# Patient Record
Sex: FEMALE | NOT HISPANIC OR LATINO | Employment: UNEMPLOYED | URBAN - METROPOLITAN AREA
[De-identification: names, ages, dates, MRNs, and addresses within clinical notes are randomized per-mention and may not be internally consistent; named-entity substitution may affect disease eponyms.]

---

## 2021-06-04 ENCOUNTER — TELEPHONE (OUTPATIENT)
Dept: NEUROSURGERY | Facility: CLINIC | Age: 51
End: 2021-06-04

## 2021-06-04 NOTE — TELEPHONE ENCOUNTER
Caller: IRENE HERNANDEZ    Relationship to patient: SELF    Best call back number: [phone]    Chief complaint:     Type of visit: NEW PATIENT    Requested date: AS SOON AS POSSIBLE     If rescheduling, when is the original appointment:     Additional notes:  PT CALLED AND STATED SHE WOULD LIKE TO RESCHEDULE HER NEW PATIENT APPT.  PTS ORIGINAL APPT WAS SCHEDULE WITH [facility] FOR DR. FALCON.  DUE TO DR. FALCON BEING RETIRED PLEASE ADIVSE ON SCHEDULING.  PTS ORIGINAL APPT WAS ON 01/26/21 AND WAS CANCELLED DUE TO TRANSPORTATION ISSUES.    PLEASE CALL PT  THANK YOU

## 2021-06-28 ENCOUNTER — OFFICE VISIT (OUTPATIENT)
Dept: NEUROSURGERY | Facility: CLINIC | Age: 51
End: 2021-06-28

## 2021-06-28 VITALS
TEMPERATURE: 96.8 F | DIASTOLIC BLOOD PRESSURE: 100 MMHG | SYSTOLIC BLOOD PRESSURE: 140 MMHG | WEIGHT: 184.6 LBS | HEIGHT: 63 IN | BODY MASS INDEX: 32.71 KG/M2

## 2021-06-28 DIAGNOSIS — M54.42 CHRONIC BILATERAL LOW BACK PAIN WITH BILATERAL SCIATICA: ICD-10-CM

## 2021-06-28 DIAGNOSIS — G89.29 CHRONIC BILATERAL LOW BACK PAIN WITH BILATERAL SCIATICA: ICD-10-CM

## 2021-06-28 DIAGNOSIS — M51.36 DDD (DEGENERATIVE DISC DISEASE), LUMBAR: Primary | ICD-10-CM

## 2021-06-28 DIAGNOSIS — M19.90 ARTHRITIS: ICD-10-CM

## 2021-06-28 DIAGNOSIS — J45.909 ASTHMA, UNSPECIFIED ASTHMA SEVERITY, UNSPECIFIED WHETHER COMPLICATED, UNSPECIFIED WHETHER PERSISTENT: ICD-10-CM

## 2021-06-28 DIAGNOSIS — G47.30 SLEEP APNEA, UNSPECIFIED TYPE: ICD-10-CM

## 2021-06-28 DIAGNOSIS — M54.41 CHRONIC BILATERAL LOW BACK PAIN WITH BILATERAL SCIATICA: ICD-10-CM

## 2021-06-28 PROCEDURE — 99204 OFFICE O/P NEW MOD 45 MIN: CPT | Performed by: PHYSICIAN ASSISTANT

## 2021-06-28 RX ORDER — ALBUTEROL SULFATE 2.5 MG/3ML
SOLUTION RESPIRATORY (INHALATION)
COMMUNITY
Start: 2021-05-26

## 2021-06-28 RX ORDER — MELOXICAM 15 MG/1
TABLET ORAL
COMMUNITY
Start: 2021-06-22

## 2021-06-28 RX ORDER — LISINOPRIL 40 MG/1
40 TABLET ORAL DAILY
COMMUNITY
Start: 2021-06-07

## 2021-06-28 RX ORDER — ROSUVASTATIN CALCIUM 20 MG/1
20 TABLET, COATED ORAL DAILY
COMMUNITY

## 2021-06-28 NOTE — PROGRESS NOTES
Patient: Blanca Sandoval  : 1970  Chart #: [id number]    Date of Service: 2021    Chief Complaint   Patient presents with   • Neck Pain     new pt   • Back Pain   • Leg Pain     HPI  This is a 51-year-old female with a long history of low back pain who presents today for reevaluation by neurosurgery.  The patient reports that she was evaluated by neurosurgery in [place] in 2017 or 2018 and they told her she had degenerative disc disease and that eventually she would need to have surgery.  She did go to physical therapy for about 3 visit but that made her pain worse, she requested to be referred to pain management but that never got set up.  Her last MRI scan was in [place] but she does not have copies.  Today she states that she has pain all across the lower back and into the thoracic area and across to her anterior ribs, her pain is worse with twisting.  Her worst pain is across her lower back radiating into the hips and pain that radiates into the left anterior thigh she describes as aching throbbing stabbing and sharp.  Sitting does not really help, she gets pain all the way down to the top of both feet with extended walking.  No complaints of bowel or bladder dysfunction.    Chronic illnesses:  Tobacco abuse  Chronic low back pain and neck pain  Degenerative disc disease  Past Medical History:   Diagnosis Date   • Anxiety and depression    • Arthritis    • Asthma    • Back pain    • GERD (gastroesophageal reflux disease)    • Hyperlipidemia    • Hypertension    • Low back pain    • Neck pain    • Sleep apnea        No Known Allergies      Current Outpatient Medications:   •  rosuvastatin (CRESTOR) 20 MG tablet, Take 20 mg by mouth Daily., Disp: , Rfl:   •  albuterol (PROVENTIL) (2.5 MG/3ML) 0.083% nebulizer solution, INHALE 1 VAIL INTO THE LUNGS EVERY 4 TO 6 HOURS AS NEEDED, Disp: , Rfl:   •  lisinopril (PRINIVIL,ZESTRIL) 40 MG tablet, Take 40 mg by mouth Daily., Disp: , Rfl:   •  meloxicam  (MOBIC) 15 MG tablet, , Disp: , Rfl:     Social History     Socioeconomic History   • Marital status:      Spouse name: Not on file   • Number of children: Not on file   • Years of education: Not on file   • Highest education level: Not on file   Tobacco Use   • Smoking status: Current Some Day Smoker     Packs/day: 1.50   Vaping Use   • Vaping Use: Former   Substance and Sexual Activity   • Alcohol use: Not Currently   • Drug use: Yes     Types: Marijuana     Comment: occ   • Sexual activity: Defer       History reviewed. No pertinent family history.    Review of Systems   Constitutional: Positive for appetite change and fatigue. Negative for activity change, chills, diaphoresis, fever and unexpected weight change.   HENT: Positive for congestion, ear discharge, ear pain and sinus pressure. Negative for dental problem, drooling, facial swelling, hearing loss, mouth sores, nosebleeds, postnasal drip, rhinorrhea, sinus pain, sneezing, sore throat, tinnitus, trouble swallowing and voice change.    Eyes: Positive for itching. Negative for photophobia, pain, discharge, redness and visual disturbance.   Respiratory: Positive for cough, shortness of breath and wheezing. Negative for apnea, choking, chest tightness and stridor.    Cardiovascular: Positive for leg swelling. Negative for chest pain and palpitations.   Gastrointestinal: Positive for abdominal distention and constipation. Negative for abdominal pain, anal bleeding, blood in stool, diarrhea, nausea, rectal pain and vomiting.   Endocrine: Positive for cold intolerance and heat intolerance. Negative for polydipsia, polyphagia and polyuria.   Genitourinary: Positive for difficulty urinating and frequency. Negative for decreased urine volume, dyspareunia, dysuria, enuresis, flank pain, genital sores, hematuria, menstrual problem, pelvic pain, urgency, vaginal bleeding and vaginal discharge.   Musculoskeletal: Positive for back pain, neck pain and neck  "stiffness. Negative for arthralgias, gait problem, joint swelling and myalgias.   Skin: Negative for color change, pallor, rash and wound.   Allergic/Immunologic: Positive for environmental allergies. Negative for food allergies and immunocompromised state.   Neurological: Positive for tremors (back and hands). Negative for dizziness, seizures, syncope, facial asymmetry, speech difficulty, weakness, light-headedness, numbness and headaches.   Hematological: Negative for adenopathy. Does not bruise/bleed easily.   Psychiatric/Behavioral: Positive for sleep disturbance. Negative for agitation, behavioral problems, confusion, decreased concentration, dysphoric mood, hallucinations, self-injury and suicidal ideas. The patient is nervous/anxious. The patient is not hyperactive.        Patient's Body mass index is 32.7 kg/m². indicating that she is obese (BMI >30). Obesity-related health conditions include the following: obstructive sleep apnea, hypertension and osteoarthritis. Obesity is unchanged. BMI is is above average; BMI management plan is completed.      Social History    Tobacco Use      Smoking status: Current Some Day Smoker        Packs/day: 1.50  Blanca Sandoval  reports that she has been smoking. She has been smoking about 1.50 packs per day. She does not have any smokeless tobacco history on file.. I have educated her on the risk of diseases from using tobacco products such as cancer, COPD, heart disease and Degenerative disc disease.     I advised her to quit and she is not willing to quit, however she states that she did try the Chantix once before and I have recommended that she try it again, she is to discuss this with her PCP.    I spent 5 minutes counseling the patient.    Physical examination:  Blood pressure 140/100, temperature 96.8 °F (36 °C), height 160 cm (63\"), weight 83.7 kg (184 lb 9.6 oz).  HEENT- normocephalic, atraumatic, sclera clear  Lungs-normal expansion, no wheezing  Heart-regular rate " and rhythm  Extremities-positive pulses, no edema    Neurologic Exam    WDWN WF  A/A/C, speech clear, attention normal, conversant, answers questions appropriately, good historian.  Cranial nerves II through XII are intact  Motor examination does not reveal weakness in the , upper or lower extremities.   Sensation is intact.  Gait is normal, balance is normal.   No tremors are noted.  Reflexes are intact.     Palpation of the paraspinal muscles of the thoracic and lumbar spine and palpation into the SI joints reveals tenderness, worse in the left SI joint, no significant buttocks pain, mild pain in the left hip bursa.  Straight leg raising causes low back pain.    Radiographic Imaging:  No new studies for review    Medical Decision Making  Assessment and Plan:  1.  Chronic low back pain  2.  Degenerative disc disease lumbar spine per the patient's report  3.  Tobacco abuse-we have discussed smoking cessation which is really a must for her given her COPD and degenerative disc disease.  4.  Sleep apnea-the patient has not been evaluated but she thinks she needs a CPAP, she does not sleep well, she is tired in the morning when she wakes up, she is tired in the evenings and has difficulties with sleep. 5.  Obesity-BMI 32.7  6.  Physical deconditioning    I have ordered MRI of the lumbar spine.  Have also ordered x-rays of the thoracic and lumbar spine with flexion-extension views on lumbar.  However see her back in the office after her diagnostic studies are completed.  I have also made a referral to sleep medicine for evaluation.    Patient is in agreement with this plan.    Fawn Cardenas PA-C       Patient Care Team:  Rose Blair PA as PCP - General (Physician Assistant)  Rose Blair PA as Referring Physician (Physician Assistant)

## 2021-07-23 ENCOUNTER — TELEPHONE (OUTPATIENT)
Dept: NEUROSURGERY | Facility: CLINIC | Age: 51
End: 2021-07-23

## 2021-07-23 NOTE — TELEPHONE ENCOUNTER
Caller: MICHAEL    Relationship: [facility]    Best call back number: [phone]    What is the best time to reach you:ANYTIME    Who are you requesting to speak with (clinical staff, provider,  specific staff member):CLINICAL    Do you know the name of the person who called: NA    What was the call regarding:MICHAEL FROM [facility] CALLED AND WANTED TO MAKE OFFICE AWARE THAT INSURANCE IS DENYING THE PTS MRI BECAUSE THEY NEEDED CT OR XRAY IMAGING BEFORE AUTH. CAN BE GIVEN FOR THE MRI. MICHAEL STATED THE PT ONLY HAD A OFFICE NOTE. MICHAEL ALSO STATED THAT THE REFERRAL HAD BEEN ROUTED BACK TO OUR PRACTICE. PLEASE ADVISE THANK YOU!    Do you require a callback:NO

## 2021-07-27 ENCOUNTER — APPOINTMENT (OUTPATIENT)
Dept: MRI IMAGING | Facility: HOSPITAL | Age: 51
End: 2021-07-27

## 2021-08-16 ENCOUNTER — TELEPHONE (OUTPATIENT)
Dept: NEUROSURGERY | Facility: CLINIC | Age: 51
End: 2021-08-16

## 2021-08-16 NOTE — TELEPHONE ENCOUNTER
I AM RESCHEDULING THIS PATIENT'S MRI LUMBAR SPINE. SPOKE WITH PT AND SHE WOULD LIKE TO HAVE CERVICAL MRI AS WELL SINCE SHE IS HAVING HEADACHES AND NECK PAIN. PLEASE ADVISE.

## 2021-08-16 NOTE — TELEPHONE ENCOUNTER
At patient's last visit, they were only able to appropriately work-up the patient's low back and leg pain.  If possible, please have her keep her lumbar of the MRI and have her follow-up with [facility] for work-up of that scan.  If patient is continuing to have neck pain and headaches, [facility] will discuss that at that time.

## 2021-08-31 ENCOUNTER — OFFICE VISIT (OUTPATIENT)
Dept: NEUROSURGERY | Facility: CLINIC | Age: 51
End: 2021-08-31

## 2021-08-31 ENCOUNTER — HOSPITAL ENCOUNTER (OUTPATIENT)
Dept: MRI IMAGING | Facility: HOSPITAL | Age: 51
Discharge: HOME | End: 2021-08-31
Admitting: PHYSICIAN ASSISTANT

## 2021-08-31 VITALS
HEIGHT: 63 IN | DIASTOLIC BLOOD PRESSURE: 90 MMHG | SYSTOLIC BLOOD PRESSURE: 140 MMHG | WEIGHT: 186.4 LBS | BODY MASS INDEX: 33.03 KG/M2 | TEMPERATURE: 96.4 F

## 2021-08-31 DIAGNOSIS — G89.29 CHRONIC BILATERAL LOW BACK PAIN WITH BILATERAL SCIATICA: ICD-10-CM

## 2021-08-31 DIAGNOSIS — G47.30 SLEEP APNEA, UNSPECIFIED TYPE: ICD-10-CM

## 2021-08-31 DIAGNOSIS — M54.42 CHRONIC BILATERAL LOW BACK PAIN WITH BILATERAL SCIATICA: ICD-10-CM

## 2021-08-31 DIAGNOSIS — M54.41 CHRONIC BILATERAL LOW BACK PAIN WITH BILATERAL SCIATICA: ICD-10-CM

## 2021-08-31 DIAGNOSIS — J45.909 ASTHMA, UNSPECIFIED ASTHMA SEVERITY, UNSPECIFIED WHETHER COMPLICATED, UNSPECIFIED WHETHER PERSISTENT: ICD-10-CM

## 2021-08-31 DIAGNOSIS — M79.604 BILATERAL LEG PAIN: ICD-10-CM

## 2021-08-31 DIAGNOSIS — M19.90 ARTHRITIS: ICD-10-CM

## 2021-08-31 DIAGNOSIS — M19.90 ARTHRITIS: Primary | ICD-10-CM

## 2021-08-31 DIAGNOSIS — M79.605 BILATERAL LEG PAIN: ICD-10-CM

## 2021-08-31 DIAGNOSIS — R53.81 PHYSICAL DECONDITIONING: ICD-10-CM

## 2021-08-31 DIAGNOSIS — M51.36 DDD (DEGENERATIVE DISC DISEASE), LUMBAR: ICD-10-CM

## 2021-08-31 PROCEDURE — 72148 MRI LUMBAR SPINE W/O DYE: CPT

## 2021-08-31 PROCEDURE — 99214 OFFICE O/P EST MOD 30 MIN: CPT | Performed by: PHYSICIAN ASSISTANT

## 2021-09-02 PROBLEM — M79.604 BILATERAL LEG PAIN: Status: ACTIVE | Noted: 2021-09-02

## 2021-09-02 PROBLEM — M79.605 BILATERAL LEG PAIN: Status: ACTIVE | Noted: 2021-09-02

## 2021-09-02 PROBLEM — R53.81 PHYSICAL DECONDITIONING: Status: ACTIVE | Noted: 2021-09-02

## 2021-09-02 NOTE — PROGRESS NOTES
Patient: Blanca Sandoval  : 1970  Chart #: [id number]    Date of service: 2021      Chief Complaint   Patient presents with   • Follow-up     MRI   • Back Pain     Back Pain  Pertinent negatives include no abdominal pain, chest pain, dysuria, fever, headaches, numbness, pelvic pain or weakness.     This is a 51-year-old female with a long history of low back pain who presents today for reevaluation by neurosurgery.  The patient reports that she was evaluated by neurosurgery in [place] in 2017 or 2018 and they told her she had degenerative disc disease and that eventually she would need to have surgery.  She did go to physical therapy recently for about 3 visit but that made her pain worse, she requested to be referred to pain management but that never got set up.  Her last MRI scan was in [place] but she does not have copies.  Today she states that she has pain all across the lower back and into the thoracic area and across to her anterior ribs, her pain is worse with twisting.  Her worst pain is across her lower back radiating into the hips and pain that radiates into the left anterior thigh she describes as aching throbbing stabbing and sharp.  Sitting does not really help, she gets pain all the way down to the top of both feet with extended walking.  No complaints of bowel or bladder dysfunction.    She presents today with new diagnostic studies to review.     Chronic illnesses:  Tobacco abuse  Chronic low back pain and neck pain  Degenerative disc disease  Past Medical History:   Diagnosis Date   • Anxiety and depression    • Arthritis    • Asthma    • Back pain    • GERD (gastroesophageal reflux disease)    • Hyperlipidemia    • Hypertension    • Low back pain    • Neck pain    • Sleep apnea        No Known Allergies      Current Outpatient Medications:   •  albuterol (PROVENTIL) (2.5 MG/3ML) 0.083% nebulizer solution, INHALE 1 VAIL INTO THE LUNGS EVERY 4 TO 6 HOURS AS NEEDED, Disp: , Rfl:   •   lisinopril (PRINIVIL,ZESTRIL) 40 MG tablet, Take 40 mg by mouth Daily., Disp: , Rfl:   •  rosuvastatin (CRESTOR) 20 MG tablet, Take 20 mg by mouth Daily., Disp: , Rfl:   •  meloxicam (MOBIC) 15 MG tablet, , Disp: , Rfl:     Social History     Socioeconomic History   • Marital status:      Spouse name: Not on file   • Number of children: Not on file   • Years of education: Not on file   • Highest education level: Not on file   Tobacco Use   • Smoking status: Current Some Day Smoker     Packs/day: 1.50   Vaping Use   • Vaping Use: Former   Substance and Sexual Activity   • Alcohol use: Not Currently   • Drug use: Yes     Types: Marijuana     Comment: occ   • Sexual activity: Defer       History reviewed. No pertinent family history.    Review of Systems   Constitutional: Positive for appetite change and fatigue. Negative for activity change, chills, diaphoresis, fever and unexpected weight change.   HENT: Positive for congestion, ear discharge, ear pain and sinus pressure. Negative for dental problem, drooling, facial swelling, hearing loss, mouth sores, nosebleeds, postnasal drip, rhinorrhea, sinus pain, sneezing, sore throat, tinnitus, trouble swallowing and voice change.    Eyes: Positive for itching. Negative for photophobia, pain, discharge, redness and visual disturbance.   Respiratory: Positive for cough, shortness of breath and wheezing. Negative for apnea, choking, chest tightness and stridor.    Cardiovascular: Positive for leg swelling. Negative for chest pain and palpitations.   Gastrointestinal: Positive for abdominal distention and constipation. Negative for abdominal pain, anal bleeding, blood in stool, diarrhea, nausea, rectal pain and vomiting.   Endocrine: Positive for cold intolerance and heat intolerance. Negative for polydipsia, polyphagia and polyuria.   Genitourinary: Positive for difficulty urinating and frequency. Negative for decreased urine volume, dyspareunia, dysuria, enuresis,  flank pain, genital sores, hematuria, menstrual problem, pelvic pain, urgency, vaginal bleeding and vaginal discharge.   Musculoskeletal: Positive for back pain, neck pain and neck stiffness. Negative for arthralgias, gait problem, joint swelling and myalgias.   Skin: Negative for color change, pallor, rash and wound.   Allergic/Immunologic: Positive for environmental allergies. Negative for food allergies and immunocompromised state.   Neurological: Positive for tremors (back and hands). Negative for dizziness, seizures, syncope, facial asymmetry, speech difficulty, weakness, light-headedness, numbness and headaches.   Hematological: Negative for adenopathy. Does not bruise/bleed easily.   Psychiatric/Behavioral: Positive for sleep disturbance. Negative for agitation, behavioral problems, confusion, decreased concentration, dysphoric mood, hallucinations, self-injury and suicidal ideas. The patient is nervous/anxious. The patient is not hyperactive.        Patient's Body mass index is 33.02 kg/m². indicating that she is obese (BMI >30). Obesity-related health conditions include the following: obstructive sleep apnea, hypertension and osteoarthritis. Obesity is unchanged. BMI is is above average; BMI management plan is completed.      Social History    Tobacco Use      Smoking status: Current Some Day Smoker        Packs/day: 1.50  Blanca Sandoval  reports that she has been smoking. She has been smoking about 1.50 packs per day. She does not have any smokeless tobacco history on file.. I have educated her on the risk of diseases from using tobacco products such as cancer, COPD, heart disease and Degenerative disc disease.     I advised her to quit and she is not willing to quit, however she states that she did try the Chantix once before and I have recommended that she try it again, she is to discuss this with her PCP.    I spent 5 minutes counseling the patient.    Physical examination:  Blood pressure 140/90,  "temperature 96.4 °F (35.8 °C), height 160 cm (63\"), weight 84.6 kg (186 lb 6.4 oz).  HEENT- normocephalic, atraumatic, sclera clear  Lungs-normal expansion, no wheezing  Heart-regular rate and rhythm  Extremities-positive pulses, no edema    Neurologic Exam    WDWN WF  A/A/C, speech clear, attention normal, conversant, answers questions appropriately, good historian.  Cranial nerves II through XII are intact  Motor examination does not reveal weakness in the , upper or lower extremities.   Sensation is intact.  Gait is normal, balance is normal.   No tremors are noted.  Reflexes are intact.     Palpation of the paraspinal muscles of the thoracic and lumbar spine and palpation into the SI joints reveals tenderness, worse in the left SI joint, no significant buttocks pain, mild pain in the left hip bursa.  Straight leg raising causes low back pain.    Radiographic Imaging:  MRI scan of the lumbar spine shows degenerative disc disease at L4-5 and L5-S1.  There is mild Modic changes of the endplates of L4 and L5.  There is a broad-based disc bulging at L4-5 with bilateral foraminal narrowing mild facet hypertrophy worse on the left than the right and fluid noted within the facet joints at L4-5.  At L5-S1 there is a mild broad-based disc bulging without nerve root compression there is again mild fluid noted within the facet joints.    Medical Decision Making  Assessment and Plan:  1.  Chronic low back pain-consistent with degenerative disc disease at L4-5 and L5-S1.  Unfortunately the patient was unable to get flexion-extension x-rays of the lumbar spine.  We discussed today that this is of significant importance to make determination of instability with facet joints.  Based upon the findings on the diagnostic studies gives guidance to whether surgical intervention is required.  2.  Degenerative disc disease lumbar spine, L4-5 and L5-S1.  3.  Tobacco abuse  4.  Sleep apnea-the patient has been referred to sleep " medicine for evaluation of sleep apnea.  5.  Obesity-BMI 33.02, weight 186  6.  Physical deconditioning    The patient will follow up with flexion-extension x-rays of the lumbar spine and thoracic spine.    QASIM PeoplesC       Patient Care Team:  Rose Blair PA as PCP - General (Physician Assistant)  Rose Blair PA as Referring Physician (Physician Assistant)

## 2021-09-09 ENCOUNTER — OFFICE VISIT (OUTPATIENT)
Dept: NEUROSURGERY | Facility: CLINIC | Age: 51
End: 2021-09-09

## 2021-09-09 ENCOUNTER — HOSPITAL ENCOUNTER (OUTPATIENT)
Dept: GENERAL RADIOLOGY | Facility: HOSPITAL | Age: 51
Discharge: HOME | End: 2021-09-09

## 2021-09-09 VITALS
TEMPERATURE: 96.8 F | DIASTOLIC BLOOD PRESSURE: 74 MMHG | WEIGHT: 179.4 LBS | BODY MASS INDEX: 31.79 KG/M2 | HEIGHT: 63 IN | SYSTOLIC BLOOD PRESSURE: 140 MMHG

## 2021-09-09 DIAGNOSIS — M54.42 CHRONIC BILATERAL LOW BACK PAIN WITH BILATERAL SCIATICA: ICD-10-CM

## 2021-09-09 DIAGNOSIS — R53.81 PHYSICAL DECONDITIONING: ICD-10-CM

## 2021-09-09 DIAGNOSIS — G89.29 CHRONIC BILATERAL LOW BACK PAIN WITH BILATERAL SCIATICA: ICD-10-CM

## 2021-09-09 DIAGNOSIS — M19.90 ARTHRITIS: Primary | ICD-10-CM

## 2021-09-09 DIAGNOSIS — M54.41 CHRONIC BILATERAL LOW BACK PAIN WITH BILATERAL SCIATICA: ICD-10-CM

## 2021-09-09 DIAGNOSIS — M51.36 DDD (DEGENERATIVE DISC DISEASE), LUMBAR: ICD-10-CM

## 2021-09-09 DIAGNOSIS — M19.90 ARTHRITIS: ICD-10-CM

## 2021-09-09 PROCEDURE — 72070 X-RAY EXAM THORAC SPINE 2VWS: CPT

## 2021-09-09 PROCEDURE — 99214 OFFICE O/P EST MOD 30 MIN: CPT | Performed by: PHYSICIAN ASSISTANT

## 2021-09-09 PROCEDURE — 72110 X-RAY EXAM L-2 SPINE 4/>VWS: CPT

## 2021-09-09 NOTE — PROGRESS NOTES
Blanca Sandoval   1970   [id number]       2021     Chief Complaint   Patient presents with   • Follow-up     xray        HPI   ***    Description of pain-***  Timing of pain-***  Location of pain-***,   radiation of pain-***  Duration of Pain-***  Treatment-***    Chronic Illnesses:  ***  Past Medical History:  No date: Anxiety and depression  No date: Arthritis  No date: Asthma  No date: Back pain  No date: GERD (gastroesophageal reflux disease)  No date: Hyperlipidemia  No date: Hypertension  No date: Low back pain  No date: Neck pain  No date: Sleep apnea     Past Surgical History:   Procedure Laterality Date   •  SECTION     • KNEE SURGERY          No Known Allergies       Current Outpatient Medications:   •  albuterol (PROVENTIL) (2.5 MG/3ML) 0.083% nebulizer solution, INHALE 1 VAIL INTO THE LUNGS EVERY 4 TO 6 HOURS AS NEEDED, Disp: , Rfl:   •  lisinopril (PRINIVIL,ZESTRIL) 40 MG tablet, Take 40 mg by mouth Daily., Disp: , Rfl:   •  meloxicam (MOBIC) 15 MG tablet, , Disp: , Rfl:   •  rosuvastatin (CRESTOR) 20 MG tablet, Take 20 mg by mouth Daily., Disp: , Rfl:      Social History     Socioeconomic History   • Marital status:      Spouse name: Not on file   • Number of children: Not on file   • Years of education: Not on file   • Highest education level: Not on file   Tobacco Use   • Smoking status: Current Some Day Smoker     Packs/day: 1.00   Vaping Use   • Vaping Use: Former   Substance and Sexual Activity   • Alcohol use: Not Currently   • Drug use: Yes     Types: Marijuana     Comment: occ   • Sexual activity: Defer        family history is not on file.     Social History    Tobacco Use      Smoking status: Current Some Day Smoker        Packs/day: 1.00       Body mass index is 31.78 kg/m².   Patient's Body mass index is 31.78 kg/m². indicating that she is {weight categories:02730}.       /74 (BP Location: Right arm, Patient Position: Sitting, Cuff Size: Adult)   Temp 96.8  "°F (36 °C)   Ht 160 cm (63\")   Wt 81.4 kg (179 lb 6.4 oz)   BMI 31.78 kg/m²    Physical Examination:  HEENT-wnl  Lungs-No wheezing or SOB      Neurologic Exam   ***    Radiological Data Review:  I have independently interpreted the imaging and discussed the findings with patient and discussed appropriate management.  ***      Other diagnostic test review:    Assessment and Plan:        Fawn Cardenas, MICHAEL      PCP:  Rose Blair PA     "

## 2021-09-09 NOTE — PROGRESS NOTES
Patient: Blanca Sandoval  : 1970  Chart #: [id number]    Date of service: 2021      Chief Complaint   Patient presents with   • Follow-up     xray     Back Pain  Pertinent negatives include no abdominal pain, chest pain, dysuria, fever, headaches, numbness, pelvic pain or weakness.     This is a 51-year-old female with a long history of low back pain who presents today for reevaluation by neurosurgery.  The patient reports that she was evaluated by neurosurgery in [place] in 2017 or 2018 and they told her she had degenerative disc disease and that eventually she would need to have surgery.  She did go to physical therapy recently for about 3 visit but that made her pain worse, she requested to be referred to pain management but that never got set up.  Her last MRI scan was in [place] but she does not have copies.  Today she states that she has pain all across the lower back and into the thoracic area and across to her anterior ribs, her pain is worse with twisting.  Her worst pain is across her lower back radiating into the hips and pain that radiates into the left anterior thigh she describes as aching throbbing stabbing and sharp.  Sitting does not really help, she gets pain all the way down to the top of both feet with extended walking.  No complaints of bowel or bladder dysfunction.    She presents today with new diagnostic studies to review.     Chronic illnesses:  Tobacco abuse  Chronic low back pain and neck pain  Degenerative disc disease  Past Medical History:   Diagnosis Date   • Anxiety and depression    • Arthritis    • Asthma    • Back pain    • GERD (gastroesophageal reflux disease)    • Hyperlipidemia    • Hypertension    • Low back pain    • Neck pain    • Sleep apnea        No Known Allergies      Current Outpatient Medications:   •  albuterol (PROVENTIL) (2.5 MG/3ML) 0.083% nebulizer solution, INHALE 1 VAIL INTO THE LUNGS EVERY 4 TO 6 HOURS AS NEEDED, Disp: , Rfl:   •  lisinopril  (PRINIVIL,ZESTRIL) 40 MG tablet, Take 40 mg by mouth Daily., Disp: , Rfl:   •  meloxicam (MOBIC) 15 MG tablet, , Disp: , Rfl:   •  rosuvastatin (CRESTOR) 20 MG tablet, Take 20 mg by mouth Daily., Disp: , Rfl:     Social History     Socioeconomic History   • Marital status:      Spouse name: Not on file   • Number of children: Not on file   • Years of education: Not on file   • Highest education level: Not on file   Tobacco Use   • Smoking status: Current Some Day Smoker     Packs/day: 1.00   Vaping Use   • Vaping Use: Former   Substance and Sexual Activity   • Alcohol use: Not Currently   • Drug use: Yes     Types: Marijuana     Comment: occ   • Sexual activity: Defer       History reviewed. No pertinent family history.    Review of Systems   Constitutional: Positive for appetite change and fatigue. Negative for activity change, chills, diaphoresis, fever and unexpected weight change.   HENT: Positive for congestion, ear discharge, ear pain and sinus pressure. Negative for dental problem, drooling, facial swelling, hearing loss, mouth sores, nosebleeds, postnasal drip, rhinorrhea, sinus pain, sneezing, sore throat, tinnitus, trouble swallowing and voice change.    Eyes: Positive for itching. Negative for photophobia, pain, discharge, redness and visual disturbance.   Respiratory: Positive for cough, shortness of breath and wheezing. Negative for apnea, choking, chest tightness and stridor.    Cardiovascular: Positive for leg swelling. Negative for chest pain and palpitations.   Gastrointestinal: Positive for abdominal distention and constipation. Negative for abdominal pain, anal bleeding, blood in stool, diarrhea, nausea, rectal pain and vomiting.   Endocrine: Positive for cold intolerance and heat intolerance. Negative for polydipsia, polyphagia and polyuria.   Genitourinary: Positive for difficulty urinating and frequency. Negative for decreased urine volume, dyspareunia, dysuria, enuresis, flank pain,  genital sores, hematuria, menstrual problem, pelvic pain, urgency, vaginal bleeding and vaginal discharge.   Musculoskeletal: Positive for back pain, neck pain and neck stiffness. Negative for arthralgias, gait problem, joint swelling and myalgias.   Skin: Negative for color change, pallor, rash and wound.   Allergic/Immunologic: Positive for environmental allergies. Negative for food allergies and immunocompromised state.   Neurological: Positive for tremors (back and hands). Negative for dizziness, seizures, syncope, facial asymmetry, speech difficulty, weakness, light-headedness, numbness and headaches.   Hematological: Negative for adenopathy. Does not bruise/bleed easily.   Psychiatric/Behavioral: Positive for sleep disturbance. Negative for agitation, behavioral problems, confusion, decreased concentration, dysphoric mood, hallucinations, self-injury and suicidal ideas. The patient is nervous/anxious. The patient is not hyperactive.        Patient's Body mass index is 31.78 kg/m². indicating that she is obese (BMI >30). Obesity-related health conditions include the following: obstructive sleep apnea, hypertension and osteoarthritis. Obesity is unchanged. BMI is is above average; BMI management plan is completed.      Social History    Tobacco Use      Smoking status: Current Some Day Smoker        Packs/day: 1.00  Blanca Sandoval  reports that she has been smoking. She has been smoking about 1.00 pack per day. She does not have any smokeless tobacco history on file.. I have educated her on the risk of diseases from using tobacco products such as cancer, COPD, heart disease and Degenerative disc disease.     I advised her to quit and she is not willing to quit, however she states that she did try the Chantix once before and I have recommended that she try it again, she is to discuss this with her PCP.    I spent 8 minutes counseling the patient.    Physical examination:  Blood pressure 140/74, temperature 96.8 °F  "(36 °C), height 160 cm (63\"), weight 81.4 kg (179 lb 6.4 oz).  HEENT- normocephalic, atraumatic, sclera clear  Lungs-normal expansion, no wheezing  Heart-regular rate and rhythm  Extremities-positive pulses, no edema    Neurologic Exam    WDWN WF  A/A/C, speech clear, attention normal, conversant, answers questions appropriately, good historian.  Cranial nerves II through XII are intact  Motor examination does not reveal weakness in the , upper or lower extremities.   Sensation is intact.  Gait is normal, balance is normal.   No tremors are noted.  Reflexes are intact.     Palpation of the paraspinal muscles of the thoracic and lumbar spine and palpation into the SI joints reveals tenderness, worse in the left SI joint, no significant buttocks pain, mild pain in the left hip bursa.  Straight leg raising causes low back pain.    Radiographic Imaging:  MRI scan of the lumbar spine shows degenerative disc disease at L4-5 and L5-S1.  There is mild Modic changes of the endplates of L4 and L5.  There is a broad-based disc bulging at L4-5 with bilateral foraminal narrowing mild facet hypertrophy worse on the left than the right and fluid noted within the facet joints at L4-5.  At L5-S1 there is a mild broad-based disc bulging without nerve root compression there is again mild fluid noted within the facet joints.    Medical Decision Making  Assessment and Plan:  1.  Chronic low back pain-consistent with degenerative disc disease at L4-5 and L5-S1.    2.  Chronic neck pain  3.  Tobacco abuse  4.  Sleep apnea-the patient has been referred to sleep medicine for evaluation of sleep apnea.  5.  Obesity-BMI 33.02, weight 186  6.  Physical deconditioning  7. Sciatica, left.    Ms. Avila is an unfortunate 51-year-old female with chronic neck and low back pain she does have significant degenerative findings at L 4-5 and L5-S1 in the lumbar spine which is not best treated with surgical intervention.  Her degenerative " osteoarthritis should best be treated with conservative treatment.  Referral to pain management for epidural steroid injection and facet injections could be beneficial.  She would like to try to do this close to home if at all possible.  Dr. Bryant is in her area if she elects to proceed.  She may be a candidate for gabapentin or Lyrica.  If the time were to come that she would require surgical intervention she realizes that she cannot be a smoker and would need to be off nicotine for 3 months prior to intervention.    QASIM PeoplesC       Patient Care Team:  Rose Blair PA as PCP - General (Physician Assistant)  Rose Blair PA as Referring Physician (Physician Assistant)

## 2022-12-14 ENCOUNTER — OFFICE VISIT (OUTPATIENT)
Dept: NEUROLOGY | Facility: CLINIC | Age: 52
End: 2022-12-14

## 2022-12-14 VITALS
TEMPERATURE: 96.9 F | SYSTOLIC BLOOD PRESSURE: 134 MMHG | BODY MASS INDEX: 32.28 KG/M2 | DIASTOLIC BLOOD PRESSURE: 80 MMHG | HEART RATE: 63 BPM | OXYGEN SATURATION: 98 % | HEIGHT: 63 IN | WEIGHT: 182.2 LBS

## 2022-12-14 DIAGNOSIS — G47.19 EXCESSIVE DAYTIME SLEEPINESS: ICD-10-CM

## 2022-12-14 DIAGNOSIS — Z99.81 ON HOME O2: ICD-10-CM

## 2022-12-14 DIAGNOSIS — F17.210 CIGARETTE SMOKER: ICD-10-CM

## 2022-12-14 DIAGNOSIS — G43.719 INTRACTABLE CHRONIC MIGRAINE WITHOUT AURA AND WITHOUT STATUS MIGRAINOSUS: ICD-10-CM

## 2022-12-14 DIAGNOSIS — R06.83 SNORING: ICD-10-CM

## 2022-12-14 DIAGNOSIS — G47.9 RESTLESS SLEEPER: ICD-10-CM

## 2022-12-14 DIAGNOSIS — G89.29 CHRONIC BACK PAIN, UNSPECIFIED BACK LOCATION, UNSPECIFIED BACK PAIN LATERALITY: Primary | ICD-10-CM

## 2022-12-14 DIAGNOSIS — R13.10 DYSPHAGIA, UNSPECIFIED TYPE: ICD-10-CM

## 2022-12-14 DIAGNOSIS — M54.9 CHRONIC BACK PAIN, UNSPECIFIED BACK LOCATION, UNSPECIFIED BACK PAIN LATERALITY: Primary | ICD-10-CM

## 2022-12-14 DIAGNOSIS — G89.29 NECK PAIN, CHRONIC: ICD-10-CM

## 2022-12-14 DIAGNOSIS — M54.2 NECK PAIN, CHRONIC: ICD-10-CM

## 2022-12-14 DIAGNOSIS — R56.9 SEIZURE-LIKE ACTIVITY: ICD-10-CM

## 2022-12-14 PROCEDURE — 99205 OFFICE O/P NEW HI 60 MIN: CPT | Performed by: NURSE PRACTITIONER

## 2022-12-14 RX ORDER — BUDESONIDE, GLYCOPYRROLATE, AND FORMOTEROL FUMARATE 160; 9; 4.8 UG/1; UG/1; UG/1
AEROSOL, METERED RESPIRATORY (INHALATION)
COMMUNITY
Start: 2022-12-02

## 2022-12-14 RX ORDER — FLUTICASONE PROPIONATE 50 MCG
2 SPRAY, SUSPENSION (ML) NASAL DAILY
COMMUNITY
Start: 2022-11-25

## 2022-12-14 RX ORDER — GALCANEZUMAB 120 MG/ML
INJECTION, SOLUTION SUBCUTANEOUS
COMMUNITY
Start: 2022-12-01

## 2022-12-14 RX ORDER — CYANOCOBALAMIN 1000 UG/ML
INJECTION, SOLUTION INTRAMUSCULAR; SUBCUTANEOUS
COMMUNITY
Start: 2022-11-25

## 2022-12-14 RX ORDER — GABAPENTIN 600 MG/1
TABLET ORAL
COMMUNITY
Start: 2022-11-25

## 2022-12-14 RX ORDER — ERGOCALCIFEROL 1.25 MG/1
CAPSULE ORAL
COMMUNITY
Start: 2022-12-02 | End: 2022-12-14

## 2022-12-14 RX ORDER — TRAZODONE HYDROCHLORIDE 150 MG/1
TABLET ORAL
COMMUNITY
Start: 2022-11-25

## 2022-12-14 RX ORDER — ESCITALOPRAM OXALATE 20 MG/1
20 TABLET ORAL DAILY
COMMUNITY
Start: 2022-11-25

## 2022-12-14 RX ORDER — UBROGEPANT 100 MG/1
TABLET ORAL
COMMUNITY
Start: 2022-12-01

## 2022-12-14 RX ORDER — FLUOCINOLONE ACETONIDE 0.11 MG/ML
OIL AURICULAR (OTIC)
COMMUNITY
Start: 2022-11-25

## 2022-12-14 RX ORDER — MONTELUKAST SODIUM 10 MG/1
TABLET ORAL
COMMUNITY
Start: 2022-11-25

## 2022-12-14 NOTE — PROGRESS NOTES
"     New Patient Office Visit      Patient Name: Blanca Sandoval  : 1970   MRN: [id number]     Referring Physician: Marnie Schofield APRN    Chief Complaint:    Chief Complaint   Patient presents with   • Consult     NP, in office to establish care for seizures. Patient states she has only had one seizure.    • Migraine     Patient states she has multiple migraines a month and daily headaches.      • Neck Pain     Patient c/o neck pain on both sides of her neck.        History of Present Illness: Blanca Sandoval is a 52 y.o. female who is here today to establish care with Neurology.  Referral states Seizure disorder, migraine, and neck pain.  Patient says she has only ever had one seizure (\"a few months back, I don't remember exactly\") and is here today for her chronic pain.  She states, \"I want to know what is wrong with my neck and throat\".  She woke up one morning and turned her head and felt pain in her shoulders- says she saw ENT in [place] and was told nothing is wrong with her throat.  She has difficulty swallowing at times.  She describes her seizure as her sitting and being very sleepy, and the next thing she knew she woke up in the floor and there was vomit in the floor near her.  She was very confused after the episode but she did not go to the hospital.  She denies urine incontinence or tongue bites during the episode.  She does think she was \"jerking around because I hit my knee\".  She denies any prior history of seizure disorder.  She is taking Gabapentin 600mg TID for pain and she feels it does not help as much as it did when she first started taking it.  She has pain that goes from her left lower back down into left leg- pain is terrible at times and seems worse when she goes to bed at night.  The pain prevents her from doing \"everyday activities\".  She was diagnosed with fibromyalgia by a doctor in [place] about 6-7 years ago.  Seen previously by [facility] and told she was " not a current surgical candidate and was referred to pain management- patient says she was never referred to pain management.  She has migraines and is taking Emgality 120mg SC monthly and Ubrelvy PRN- these medications have helped with her migraines but the injection seems to wear off the week before it's time for the next injection.  She denies chronic alcohol use and she smokes marijuana a couple weeks out the month.  Additional risk factors- BMI 32, fibromyalgia, migraine disorder, HTN, mood disorder, carpal tunnel syndrome, COPD, cigarette smoker, dyslipidemia, on home oxygen at night, marijuana use, tremor.      Sleep questionnaire reviewed- she snores, she wakes up with a dry mouth, she experiences restless sleep, she is always tired, she does not feel well rested upon awakening, she does not feel better with more sleep, she has difficulty initiating and maintaining sleep, she has excessive daytime sleepiness.  [place] score- 15.  She says she had a home sleep study recently but has never heard back about those results.      Pertinent Medical History:  MRI lumbar spine on 9/7/2021 showed-   Multilevel spondylitic changes of the lumbar spine greatest  in the lower segments L4-L5 and L5-S1 as detailed above with up to mild  to moderate anterior thecal sac effacements and moderate neuroforaminal  stenoses greatest at the right L5-S1 level, however, notable left  subarticular recess narrowing at the superiorly adjacent L4-L5 level  additionally noted.    Following taken from previous visit note with neurosurgery in September 2021:  This is a 51-year-old female with a long history of low back pain who presents today for reevaluation by neurosurgery.  The patient reports that she was evaluated by neurosurgery in [place] in 2017 or 2018 and they told her she had degenerative disc disease and that eventually she would need to have surgery.  She did go to physical therapy recently for about 3 visit but that made her  pain worse, she requested to be referred to pain management but that never got set up.  Her last MRI scan was in [place] but she does not have copies. Today she states that she has pain all across the lower back and into the thoracic area and across to her anterior ribs, her pain is worse with twisting.  Her worst pain is across her lower back radiating into the hips and pain that radiates into the left anterior thigh she describes as aching throbbing stabbing and sharp.  Sitting does not really help, she gets pain all the way down to the top of both feet with extended walking.  No complaints of bowel or bladder dysfunction.       Subjective      Review of Systems:   Review of Systems   Constitutional: Positive for activity change and appetite change. Negative for chills, diaphoresis, fatigue, fever, unexpected weight gain and unexpected weight loss.   HENT: Positive for congestion, ear pain, sinus pressure and trouble swallowing. Negative for dental problem, drooling, ear discharge, facial swelling, hearing loss, mouth sores, nosebleeds, postnasal drip, rhinorrhea, sneezing, sore throat, swollen glands, tinnitus and voice change.    Eyes: Positive for discharge and itching. Negative for blurred vision, double vision, photophobia, pain, redness and visual disturbance.   Respiratory: Positive for cough and wheezing. Negative for apnea, choking, chest tightness, shortness of breath and stridor.    Cardiovascular: Positive for leg swelling. Negative for chest pain and palpitations.   Gastrointestinal: Positive for abdominal pain and constipation. Negative for abdominal distention, anal bleeding, blood in stool, diarrhea, nausea, rectal pain, vomiting, GERD and indigestion.   Endocrine: Positive for cold intolerance. Negative for heat intolerance, polydipsia, polyphagia and polyuria.   Genitourinary: Negative.    Musculoskeletal: Positive for back pain, neck pain and neck stiffness. Negative for arthralgias, gait  problem, joint swelling, myalgias and bursitis.   Skin: Negative for color change, dry skin, pallor, rash, skin lesions, wound and bruise.   Allergic/Immunologic: Positive for environmental allergies. Negative for food allergies and immunocompromised state.   Neurological: Positive for tremors, weakness, numbness and headache. Negative for dizziness, seizures, syncope, facial asymmetry, speech difficulty, light-headedness, memory problem and confusion.   Hematological: Negative for adenopathy. Does not bruise/bleed easily.   Psychiatric/Behavioral: Positive for sleep disturbance. Negative for agitation, behavioral problems, decreased concentration, dysphoric mood, hallucinations, self-injury, suicidal ideas, negative for hyperactivity, depressed mood and stress. The patient is nervous/anxious.        Past Medical History:   Past Medical History:   Diagnosis Date   • Allergies    • Anxiety    • Anxiety and depression    • Arthritis    • Asthma    • Back pain    • Carpal tunnel syndrome    • Chronic mental illness    • COPD (chronic obstructive pulmonary disease) ([facility])    • Depression    • Difficulty in walking    • Family history of bladder problems    • Fibromyalgia    • Gait abnormality    • GERD (gastroesophageal reflux disease)    • Hyperlipidemia    • Hypertension    • Hypertension    • Low back pain    • Memory loss    • Neck pain    • Sleep apnea    • Weakness        Past Surgical History:   Past Surgical History:   Procedure Laterality Date   •  SECTION     • KNEE SURGERY         Family History:   Family History   Problem Relation Age of Onset   • Dementia Mother        Social History:   Social History     Socioeconomic History   • Marital status:    Tobacco Use   • Smoking status: Every Day     Packs/day: 1.00     Types: Cigarettes   • Smokeless tobacco: Never   Vaping Use   • Vaping Use: Former   Substance and Sexual Activity   • Alcohol use: Not Currently   • Drug use: Yes     Types:  "Marijuana     Comment: occ   • Sexual activity: Defer       Medications:     Current Outpatient Medications:   •  albuterol (PROVENTIL) (2.5 MG/3ML) 0.083% nebulizer solution, INHALE 1 VAIL INTO THE LUNGS EVERY 4 TO 6 HOURS AS NEEDED, Disp: , Rfl:   •  Breztri Aerosphere 160-9-4.8 MCG/ACT aerosol inhaler, INHALE 2 PUFF BY MOUTH AS DIRECTED TWICE A DAY, Disp: , Rfl:   •  cyanocobalamin 1000 MCG/ML injection, INJECT 1ML INTRAMUSCULARLY EVERY MONTH, Disp: , Rfl:   •  Emgality 120 MG/ML auto-injector pen, INJECT 1 PEN SUBCUTANEOUSLY ONCE A MONTH FOR MIGRAINE PREVENTION, Disp: , Rfl:   •  escitalopram (LEXAPRO) 20 MG tablet, Take 20 mg by mouth Daily., Disp: , Rfl:   •  fluocinolone acetonide (DERMOTIC) 0.01 % oil otic oil, INSTILL 5 DROPS IN EACH EAR TWICE DAILY AS NEEDED FOR ITCHING, Disp: , Rfl:   •  fluticasone (FLONASE) 50 MCG/ACT nasal spray, 2 sprays by Each Nare route Daily. Shake well before using., Disp: , Rfl:   •  gabapentin (NEURONTIN) 600 MG tablet, , Disp: , Rfl:   •  lisinopril (PRINIVIL,ZESTRIL) 40 MG tablet, Take 40 mg by mouth Daily., Disp: , Rfl:   •  meloxicam (MOBIC) 15 MG tablet, , Disp: , Rfl:   •  montelukast (SINGULAIR) 10 MG tablet, , Disp: , Rfl:   •  rosuvastatin (CRESTOR) 20 MG tablet, Take 20 mg by mouth Daily., Disp: , Rfl:   •  traZODone (DESYREL) 150 MG tablet, TAKE 1 TABLET BY MOUTH AT BEDTIME, MAY REPEAT IN 1 HOUR AS NEEDED, Disp: , Rfl:   •  ubrogepant 100 MG tablet, TAKE 1 TABLET BY MOUTH ONCE A DAY AT ONSET OF HEADACHE, Disp: , Rfl:     Allergies:   No Known Allergies    Objective     Physical Exam:  Vital Signs:   Vitals:    12/14/22 1045   BP: 134/80   BP Location: Right arm   Patient Position: Sitting   Cuff Size: Adult   Pulse: 63   Temp: 96.9 °F (36.1 °C)   SpO2: 98%   Weight: 82.6 kg (182 lb 3.2 oz)   Height: 160 cm (63\")   PainSc:   5   PainLoc: Back  Comment: head, neck.     Body mass index is 32.28 kg/m².     Physical Exam  Vitals and nursing note reviewed. "   Constitutional:       General: She is not in acute distress.     Appearance: She is well-developed. She is obese. She is not diaphoretic.   HENT:      Head: Normocephalic and atraumatic.      Mouth/Throat:      Lips: Pink.      Mouth: Mucous membranes are moist.      Dentition: Normal dentition.      Tongue: No lesions. Tongue does not deviate from midline.      Palate: No mass and lesions.      Pharynx: Oropharynx is clear. Uvula midline. No pharyngeal swelling, oropharyngeal exudate or posterior oropharyngeal erythema.      Tonsils: No tonsillar exudate.      Comments: Mallampati 4  Eyes:      Extraocular Movements: Extraocular movements intact.      Conjunctiva/sclera: Conjunctivae normal.      Pupils: Pupils are equal, round, and reactive to light.   Cardiovascular:      Rate and Rhythm: Normal rate and regular rhythm.      Heart sounds: Normal heart sounds. No murmur heard.    No friction rub. No gallop.   Pulmonary:      Effort: No respiratory distress.      Breath sounds: Wheezing present. No rales.      Comments: Increased respiratory effort.   Musculoskeletal:         General: Normal range of motion.   Skin:     General: Skin is warm and dry.      Findings: No rash.   Neurological:      Mental Status: She is alert and oriented to person, place, and time.      Cranial Nerves: Cranial nerves 2-12 are intact.      Coordination: Finger-Nose-Finger Test normal.      Gait: Gait is intact.   Psychiatric:         Mood and Affect: Mood normal.         Speech: Speech normal.         Judgment: Judgment normal.         Neurologic Exam     Mental Status   Oriented to person, place, and time.   Attention: decreased. Concentration: decreased.   Speech: speech is normal   Level of consciousness: alert  Knowledge: good and poor.   When asked a question, she is slow to respond- takes more time than it should.      Cranial Nerves   Cranial nerves II through XII intact.     CN II   Visual fields full to confrontation.     CN  III, IV, VI   Pupils are equal, round, and reactive to light.  Right pupil: Size: 3 mm. Shape: regular. Reactivity: brisk.   Left pupil: Size: 3 mm. Shape: regular. Reactivity: brisk.   CN III: no CN III palsy  CN VI: no CN VI palsy  Nystagmus: none     CN V   Facial sensation intact.     CN VII   Facial expression full, symmetric.     CN VIII   CN VIII normal.     CN IX, X   CN IX normal.   CN X normal.     CN XI   CN XI normal.     CN XII   CN XII normal.     Motor Exam   Muscle bulk: normal  Overall muscle tone: normal    Strength   Right biceps: 5/5  Left biceps: 5/5  Right triceps: 5/5  Left triceps: 5/5  Right quadriceps: 5/5  Left quadriceps: 5/5  Right hamstrin/5  Left hamstrin/5No SI tenderness bilaterally. AROM of lumbar spine and cervical spine normal. No deformities noted with palpation of lumbar and cervical spines.      Sensory Exam   Light touch normal.   Proprioception normal.     Gait, Coordination, and Reflexes     Gait  Gait: normal    Coordination   Finger to nose coordination: normal    Tremor   Resting tremor: absent  Intention tremor: absent  Action tremor: absent      Assessment / Plan      Assessment/Plan:   Diagnoses and all orders for this visit:    1. Chronic back pain, unspecified back location, unspecified back pain laterality (Primary)  -     Ambulatory Referral to Pain Management    2. Neck pain, chronic  -     Ambulatory Referral to Pain Management  -     XR Spine Cervical Complete With Flex Ext; Future    3. Dysphagia, unspecified type  -     FL Video Swallow With Speech Single Contrast; Future    4. Intractable chronic migraine without aura and without status migrainosus    5. Cigarette smoker  -     Home Sleep Study; Future    6. Seizure-like activity (HCC)  -     EEG; Future  -     MRI Brain With & Without Contrast; Future    7. On home O2    8. Snoring  -     Home Sleep Study; Future    9. Excessive daytime sleepiness  -     Home Sleep Study; Future    10. Restless  sleeper  -     Home Sleep Study; Future    11. BMI 32.0-32.9,adult  -     Home Sleep Study; Future    *Patient education on Seizure, Migraine disorder, LUPILLO, Chronic back pain, and Fibromyalgia provided today.   *Visit note from [facility] requested for review. Recent neck CT requested for review from the [facility] in [place]- diagnostic center says patient did not have the neck CT done.    *Order for barium swallow study.   *Referral to pain management for further management/evaluation.   *I have advised patient [place] laws say no driving for 90 days following a seizure. Seizure precautions discussed and encouraged. I have advised her to let friends/family know to call 911 for any seizure activity lasting more than 5 minutes.   *Will attempt to obtain results of recent home sleep study (she says she may have had this about a month ago) and if that was completed, will cancel home sleep study ordered today.   *I have discussed the importance of treatment of significant LUPILLO in regards to cardiovascular health and risk of stroke.   *Encouraged weight loss with a BMI goal of 24.     *I spent 60 minutes with the patient face to face, reviewing prior documentation and diagnostics, interview and assessment, , documentation, and providing education on seizures, chronic back pain, migraine disorder, referrals, diagnostics ordered, safety precautions, and LUPILLO.    Follow Up:   Return in about 2 months (around 2/14/2023) for Follow Up.    BENSON Rico, FNP-C  [facility]       Please note that portions of this note may have been completed with a voice recognition program. Efforts were made to edit the dictations, but occasionally words are mistranscribed.

## 2023-02-15 ENCOUNTER — TELEPHONE (OUTPATIENT)
Dept: NEUROLOGY | Facility: CLINIC | Age: 53
End: 2023-02-15

## 2023-02-15 NOTE — TELEPHONE ENCOUNTER
We cannot schedule those. She will need to contact Central Scheduling to get that rescheduled if it's with Yanna.  [phone]

## 2023-02-15 NOTE — TELEPHONE ENCOUNTER
Provider: ANDI  Caller: IRENE  Relationship to Patient: SELF  Phone Number: [phone]  Reason for Call: PT CALLED IN AND WANTED A CLINICAL STAFF MEMBER TO REACH BACK OUT TO TO GIO HER FOR HER EEG TEST THAT SHE WAS UNABLE TO MAKE INTIALLY ON 1/31/23.    PLEASE REVIEW AND ADVISE

## 2023-03-21 ENCOUNTER — HOSPITAL ENCOUNTER (OUTPATIENT)
Dept: SLEEP MEDICINE | Facility: HOSPITAL | Age: 53
End: 2023-03-21
Payer: COMMERCIAL

## 2023-03-21 ENCOUNTER — HOSPITAL ENCOUNTER (OUTPATIENT)
Dept: SLEEP MEDICINE | Facility: HOSPITAL | Age: 53
Discharge: HOME | End: 2023-03-21
Admitting: NURSE PRACTITIONER
Payer: COMMERCIAL

## 2023-03-21 ENCOUNTER — HOSPITAL ENCOUNTER (OUTPATIENT)
Dept: MRI IMAGING | Facility: HOSPITAL | Age: 53
Discharge: HOME | End: 2023-03-21
Payer: COMMERCIAL

## 2023-03-21 DIAGNOSIS — G47.19 EXCESSIVE DAYTIME SLEEPINESS: ICD-10-CM

## 2023-03-21 DIAGNOSIS — F17.210 CIGARETTE SMOKER: ICD-10-CM

## 2023-03-21 DIAGNOSIS — G47.9 RESTLESS SLEEPER: ICD-10-CM

## 2023-03-21 DIAGNOSIS — R06.83 SNORING: ICD-10-CM

## 2023-03-21 DIAGNOSIS — R56.9 SEIZURE-LIKE ACTIVITY: ICD-10-CM

## 2023-03-21 PROCEDURE — 95816 EEG AWAKE AND DROWSY: CPT

## 2023-03-21 PROCEDURE — 95806 SLEEP STUDY UNATT&RESP EFFT: CPT

## 2023-03-21 PROCEDURE — 70553 MRI BRAIN STEM W/O & W/DYE: CPT

## 2023-03-21 PROCEDURE — 0 GADOBENATE DIMEGLUMINE 529 MG/ML SOLUTION: Performed by: NURSE PRACTITIONER

## 2023-03-21 PROCEDURE — A9577 INJ MULTIHANCE: HCPCS | Performed by: NURSE PRACTITIONER

## 2023-03-21 RX ADMIN — GADOBENATE DIMEGLUMINE 15 ML: 529 INJECTION, SOLUTION INTRAVENOUS at 07:47

## 2023-03-30 PROCEDURE — 95806 SLEEP STUDY UNATT&RESP EFFT: CPT | Performed by: INTERNAL MEDICINE

## 2023-07-26 ENCOUNTER — OFFICE VISIT (OUTPATIENT)
Dept: NEUROLOGY | Facility: CLINIC | Age: 53
End: 2023-07-26
Payer: COMMERCIAL

## 2023-07-26 VITALS
DIASTOLIC BLOOD PRESSURE: 90 MMHG | BODY MASS INDEX: 32.6 KG/M2 | OXYGEN SATURATION: 96 % | TEMPERATURE: 97.3 F | HEART RATE: 69 BPM | HEIGHT: 63 IN | WEIGHT: 184 LBS | SYSTOLIC BLOOD PRESSURE: 142 MMHG

## 2023-07-26 DIAGNOSIS — G47.33 OSA (OBSTRUCTIVE SLEEP APNEA): Primary | ICD-10-CM

## 2023-07-26 DIAGNOSIS — R56.9 SEIZURE-LIKE ACTIVITY: ICD-10-CM

## 2023-07-26 DIAGNOSIS — M79.18 MYOFASCIAL PAIN: ICD-10-CM

## 2023-07-26 DIAGNOSIS — G89.29 NECK PAIN, CHRONIC: ICD-10-CM

## 2023-07-26 DIAGNOSIS — M54.2 NECK PAIN, CHRONIC: ICD-10-CM

## 2023-07-26 PROCEDURE — 1159F MED LIST DOCD IN RCRD: CPT | Performed by: NURSE PRACTITIONER

## 2023-07-26 PROCEDURE — 99214 OFFICE O/P EST MOD 30 MIN: CPT | Performed by: NURSE PRACTITIONER

## 2023-07-26 PROCEDURE — 1160F RVW MEDS BY RX/DR IN RCRD: CPT | Performed by: NURSE PRACTITIONER

## 2023-07-26 RX ORDER — ESOMEPRAZOLE MAGNESIUM 40 MG/1
40 CAPSULE, DELAYED RELEASE ORAL DAILY
COMMUNITY
Start: 2023-07-21

## 2023-07-26 RX ORDER — CETIRIZINE HYDROCHLORIDE 10 MG/1
1 TABLET ORAL DAILY
COMMUNITY
Start: 2023-07-24

## 2023-07-26 RX ORDER — ERGOCALCIFEROL 1.25 MG/1
CAPSULE ORAL
COMMUNITY
Start: 2023-07-24

## 2023-07-26 RX ORDER — ESTRADIOL 1 MG/1
TABLET ORAL
COMMUNITY
Start: 2023-07-24

## 2023-07-26 RX ORDER — IPRATROPIUM BROMIDE AND ALBUTEROL SULFATE 2.5; .5 MG/3ML; MG/3ML
SOLUTION RESPIRATORY (INHALATION)
COMMUNITY
Start: 2023-07-24

## 2023-07-26 RX ORDER — GABAPENTIN 800 MG/1
800 TABLET ORAL DAILY
Qty: 90 TABLET | Refills: 2 | Status: SHIPPED | OUTPATIENT
Start: 2023-07-26

## 2023-07-26 NOTE — PROGRESS NOTES
"     Follow Up Office Visit      Patient Name: Blanca Sandoval  : 1970   MRN: [id number]     Chief Complaint:    Chief Complaint   Patient presents with   • Follow-up     Patient in office to follow up for multiple complaints.        History of Present Illness: Blanca Sandoval is a 53 y.o. female who is here today to follow up and was last seen on 2022.  She is on AutoPap therapy and says things are going well with that.  Currently on AutoPap 6-16cm, 95th percentile pressure 13.2cm, compliance 73%, AHI 1.1/hour.  She is tolerating her pressures well.  She feels her headaches and migraines may be a little better since starting AutoPap therapy.  She is using a full face mask and it is uncomfortable at times but says it was the only one she could use.  She was referred to pain management but according to EMR patient could not be contacted.  She says she would like to go to [facility], for pain management, if possible. She asks about the results of her brain MRI and EEG.  She has quite a bit of muscle pain in her neck.  She was taking Gabapentin 600mg TID, but has been out of that for a couple of months and states, \"She sent the medicine to the wrong pharmacy but now we got that fixed\".  She felt the Gabapentin dose may need to be increased as it seems to wear off at times.   *MRI brain with and without contrast on 3/21/2023 showed no acute intracranial abnormality.   *A barium swallow study was ordered at a previous visit but she says she hasn't had that done.   *Home sleep study on 3/23/2023 showed evidence of mild LUPILLO with an AHI of 11/hour and event related hypoxia.  She was set up on PAP therapy on 2023.  Current compliance report reviewed and scanned into EMR.  *EEG on 3/21/2023 was normal; MRI brain with and without contrast was noncontributory.     Following taken from previous visit note:  Blanca Sandoval is a 52 y.o. female who is here today to establish care with Neurology.  Referral states Seizure " "disorder, migraine, and neck pain.  Patient says she has only ever had one seizure (\"a few months back, I don't remember exactly\") and is here today for her chronic pain.  She states, \"I want to know what is wrong with my neck and throat\".  She woke up one morning and turned her head and felt pain in her shoulders- says she saw ENT in [place] and was told nothing is wrong with her throat.  She has difficulty swallowing at times.  She describes her seizure as her sitting and being very sleepy, and the next thing she knew she woke up in the floor and there was vomit in the floor near her.  She was very confused after the episode but she did not go to the hospital.  She denies urine incontinence or tongue bites during the episode.  She does think she was \"jerking around because I hit my knee\".  She denies any prior history of seizure disorder.  She is taking Gabapentin 600mg TID for pain and she feels it does not help as much as it did when she first started taking it.  She has pain that goes from her left lower back down into left leg- pain is terrible at times and seems worse when she goes to bed at night.  The pain prevents her from doing \"everyday activities\".  She was diagnosed with fibromyalgia by a doctor in [place] about 6-7 years ago.  Seen previously by [facility] and told she was not a current surgical candidate and was referred to pain management- patient says she was never referred to pain management.  She has migraines and is taking Emgality 120mg SC monthly and Ubrelvy PRN- these medications have helped with her migraines but the injection seems to wear off the week before it's time for the next injection.  She denies chronic alcohol use and she smokes marijuana a couple weeks out the month.  Additional risk factors- BMI 32, fibromyalgia, migraine disorder, HTN, mood disorder, carpal tunnel syndrome, COPD, cigarette smoker, dyslipidemia, on home oxygen at night, marijuana use, tremor.  "      Sleep questionnaire reviewed- she snores, she wakes up with a dry mouth, she experiences restless sleep, she is always tired, she does not feel well rested upon awakening, she does not feel better with more sleep, she has difficulty initiating and maintaining sleep, she has excessive daytime sleepiness.  [place] score- 15.  She says she had a home sleep study recently but has never heard back about those results.       Pertinent Medical History:  MRI lumbar spine on 9/7/2021 showed-   Multilevel spondylitic changes of the lumbar spine greatest  in the lower segments L4-L5 and L5-S1 as detailed above with up to mild  to moderate anterior thecal sac effacements and moderate neuroforaminal  stenoses greatest at the right L5-S1 level, however, notable left  subarticular recess narrowing at the superiorly adjacent L4-L5 level  additionally noted.     Following taken from previous visit note with neurosurgery in September 2021:  This is a 51-year-old female with a long history of low back pain who presents today for reevaluation by neurosurgery.  The patient reports that she was evaluated by neurosurgery in [place] in 2017 or 2018 and they told her she had degenerative disc disease and that eventually she would need to have surgery.  She did go to physical therapy recently for about 3 visit but that made her pain worse, she requested to be referred to pain management but that never got set up.  Her last MRI scan was in [place] but she does not have copies. Today she states that she has pain all across the lower back and into the thoracic area and across to her anterior ribs, her pain is worse with twisting.  Her worst pain is across her lower back radiating into the hips and pain that radiates into the left anterior thigh she describes as aching throbbing stabbing and sharp.  Sitting does not really help, she gets pain all the way down to the top of both feet with extended walking.  No complaints of bowel or  bladder dysfunction.     Subjective      Review of Systems:   Review of Systems   Respiratory:  Positive for apnea.    Musculoskeletal:  Positive for back pain and neck pain.   Neurological:  Positive for headache.     I have reviewed and the following portions of the patient's history were updated as appropriate: past family history, past medical history, past social history, past surgical history and problem list.    Medications:     Current Outpatient Medications:   •  albuterol (PROVENTIL) (2.5 MG/3ML) 0.083% nebulizer solution, INHALE 1 VAIL INTO THE LUNGS EVERY 4 TO 6 HOURS AS NEEDED, Disp: , Rfl:   •  Breztri Aerosphere 160-9-4.8 MCG/ACT aerosol inhaler, INHALE 2 PUFF BY MOUTH AS DIRECTED TWICE A DAY, Disp: , Rfl:   •  cetirizine (zyrTEC) 10 MG tablet, Take 1 tablet by mouth Daily., Disp: , Rfl:   •  cyanocobalamin 1000 MCG/ML injection, INJECT 1ML INTRAMUSCULARLY EVERY MONTH, Disp: , Rfl:   •  Emgality 120 MG/ML auto-injector pen, INJECT 1 PEN SUBCUTANEOUSLY ONCE A MONTH FOR MIGRAINE PREVENTION, Disp: , Rfl:   •  escitalopram (LEXAPRO) 20 MG tablet, Take 1 tablet by mouth Daily., Disp: , Rfl:   •  esomeprazole (nexIUM) 40 MG capsule, Take 1 capsule by mouth Daily., Disp: , Rfl:   •  estradiol (ESTRACE) 1 MG tablet, , Disp: , Rfl:   •  fluocinolone acetonide (DERMOTIC) 0.01 % oil otic oil, INSTILL 5 DROPS IN EACH EAR TWICE DAILY AS NEEDED FOR ITCHING, Disp: , Rfl:   •  fluticasone (FLONASE) 50 MCG/ACT nasal spray, 2 sprays by Each Nare route Daily. Shake well before using., Disp: , Rfl:   •  ipratropium-albuterol (DUO-NEB) 0.5-2.5 mg/3 ml nebulizer, INHALE 1 VIAL VIA NEBULIZER EVERY 4-6 HOURS AS NEEDED, Disp: , Rfl:   •  lisinopril (PRINIVIL,ZESTRIL) 40 MG tablet, Take 1 tablet by mouth Daily., Disp: , Rfl:   •  meloxicam (MOBIC) 15 MG tablet, , Disp: , Rfl:   •  montelukast (SINGULAIR) 10 MG tablet, , Disp: , Rfl:   •  rosuvastatin (CRESTOR) 20 MG tablet, Take 1 tablet by mouth Daily., Disp: , Rfl:   •   "traZODone (DESYREL) 150 MG tablet, TAKE 1 TABLET BY MOUTH AT BEDTIME, MAY REPEAT IN 1 HOUR AS NEEDED, Disp: , Rfl:   •  ubrogepant 100 MG tablet, TAKE 1 TABLET BY MOUTH ONCE A DAY AT ONSET OF HEADACHE, Disp: , Rfl:   •  vitamin D (ERGOCALCIFEROL) 1.25 MG (86804 UT) capsule capsule, , Disp: , Rfl:   •  gabapentin (Neurontin) 800 MG tablet, Take 1 tablet by mouth Daily., Disp: 90 tablet, Rfl: 2    Allergies:   No Known Allergies    Objective     Physical Exam:  Vital Signs:   Vitals:    07/26/23 1021   BP: 142/90   BP Location: Left arm   Patient Position: Sitting   Cuff Size: Adult   Pulse: 69   Temp: 97.3 °F (36.3 °C)   SpO2: 96%   Weight: 83.5 kg (184 lb)   Height: 160 cm (63\")   PainSc:   5   PainLoc: Back     Body mass index is 32.59 kg/m².    Physical Exam  Vitals and nursing note reviewed.   Constitutional:       General: She is not in acute distress.     Appearance: She is well-developed. She is obese. She is not diaphoretic.   HENT:      Head: Normocephalic and atraumatic.   Eyes:      Extraocular Movements: Extraocular movements intact.      Conjunctiva/sclera: Conjunctivae normal.      Pupils: Pupils are equal, round, and reactive to light.   Pulmonary:      Effort: Pulmonary effort is normal. No respiratory distress.   Musculoskeletal:         General: Normal range of motion.   Skin:     General: Skin is warm and dry.   Neurological:      Mental Status: She is alert and oriented to person, place, and time.   Psychiatric:         Mood and Affect: Mood normal.         Behavior: Behavior normal.         Thought Content: Thought content normal.         Judgment: Judgment normal.       Neurologic Exam     Mental Status   Oriented to person, place, and time.     Cranial Nerves     CN III, IV, VI   Pupils are equal, round, and reactive to light.     Assessment / Plan      Assessment/Plan:   Diagnoses and all orders for this visit:    1. LUPILLO (obstructive sleep apnea) (Primary)    2. Neck pain, chronic  -     " Ambulatory Referral to Pain Management  -     gabapentin (Neurontin) 800 MG tablet; Take 1 tablet by mouth Daily.  Dispense: 90 tablet; Refill: 2    3. Seizure-like activity  -     gabapentin (Neurontin) 800 MG tablet; Take 1 tablet by mouth Daily.  Dispense: 90 tablet; Refill: 2    4. Myofascial pain    5. BMI 32.0-32.9,adult    *Pain management referral for [facility], per patient request.   *LDL goal <70.   *I have advised patient to consider stopping smoking.   *Gabapentin 800mg TID- CSA signed and Simon reviewed.     Follow Up:   Return in about 3 weeks (around 8/16/2023) for Trigger point injections.    BENSON Rico, FNP-C  [facility] and Sleep Medicine

## 2023-08-01 ENCOUNTER — TELEPHONE (OUTPATIENT)
Dept: NEUROLOGY | Facility: CLINIC | Age: 53
End: 2023-08-01
Payer: COMMERCIAL

## 2023-08-24 ENCOUNTER — PROCEDURE VISIT (OUTPATIENT)
Dept: NEUROLOGY | Facility: CLINIC | Age: 53
End: 2023-08-24
Payer: COMMERCIAL

## 2023-08-24 VITALS
WEIGHT: 185.2 LBS | SYSTOLIC BLOOD PRESSURE: 128 MMHG | HEART RATE: 83 BPM | TEMPERATURE: 96.9 F | OXYGEN SATURATION: 96 % | DIASTOLIC BLOOD PRESSURE: 82 MMHG | BODY MASS INDEX: 32.82 KG/M2 | HEIGHT: 63 IN

## 2023-08-24 DIAGNOSIS — M79.18 MYOFASCIAL PAIN: Primary | ICD-10-CM

## 2023-08-24 RX ORDER — BUPIVACAINE HYDROCHLORIDE 5 MG/ML
8 INJECTION, SOLUTION PERINEURAL ONCE
Status: COMPLETED | OUTPATIENT
Start: 2023-08-24 | End: 2023-08-24

## 2023-08-24 RX ORDER — TIZANIDINE 4 MG/1
4 TABLET ORAL
COMMUNITY
Start: 2023-08-21

## 2023-08-24 RX ORDER — METHYLPREDNISOLONE SODIUM SUCCINATE 125 MG/2ML
125 INJECTION, POWDER, LYOPHILIZED, FOR SOLUTION INTRAMUSCULAR; INTRAVENOUS ONCE
Status: COMPLETED | OUTPATIENT
Start: 2023-08-24 | End: 2023-08-24

## 2023-08-24 RX ORDER — ATOGEPANT 60 MG/1
1 TABLET ORAL DAILY
COMMUNITY
Start: 2023-08-22

## 2023-08-24 RX ADMIN — BUPIVACAINE HYDROCHLORIDE 8 ML: 5 INJECTION, SOLUTION PERINEURAL at 13:55

## 2023-08-24 RX ADMIN — METHYLPREDNISOLONE SODIUM SUCCINATE 125 MG: 125 INJECTION, POWDER, LYOPHILIZED, FOR SOLUTION INTRAMUSCULAR; INTRAVENOUS at 13:57

## 2023-08-24 NOTE — PROGRESS NOTES
Procedure note     Patient Name: Blanca Sandoval  : 1970   MRN: [id number]     Chief Complaint:    Chief Complaint   Patient presents with    Procedure     Trigger injections       History of Present Illness: Blanca Sandoval is a 53 y.o. female who is here today for trigger point injections for myofascial pain.  She has an appointment with the pain clinic next week.      Procedure:    Trigger Point Injections      After risks and benefits were explained including bleeding, infection, worsening of the pain, damage to the area being injected, weakness, allergic reaction to medications, vascular injection, and nerve damage, signed consent was obtained.  All questions were answered.      The area of the trigger point was identified and the skin prepped three times with alcohol and the alcohol allowed to dry.  Next, a 25 gauge 1 inch needle was placed in the area of the trigger point.  Once reproduction of the pain was elicited and negative aspiration confirmed, the trigger point was injected and the needle removed.      The patient tolerated procedure well without immediate complications.     Medication used: 125mg/2 ml of Depo-Medrol; 8 ml of 0.5 % Bupivacaine    Trigger points injected: Cervical paraspinal and trapezius muscles.       Subjective     I have reviewed and the following portions of the patient's history were updated as appropriate: past family history, past medical history, past social history, past surgical history and problem list.    Medications:     Current Outpatient Medications:     albuterol (PROVENTIL) (2.5 MG/3ML) 0.083% nebulizer solution, INHALE 1 VAIL INTO THE LUNGS EVERY 4 TO 6 HOURS AS NEEDED, Disp: , Rfl:     Breztri Aerosphere 160-9-4.8 MCG/ACT aerosol inhaler, INHALE 2 PUFF BY MOUTH AS DIRECTED TWICE A DAY, Disp: , Rfl:     cetirizine (zyrTEC) 10 MG tablet, Take 1 tablet by mouth Daily., Disp: , Rfl:     cyanocobalamin 1000 MCG/ML injection, INJECT 1ML INTRAMUSCULARLY EVERY MONTH,  Disp: , Rfl:     Emgality 120 MG/ML auto-injector pen, INJECT 1 PEN SUBCUTANEOUSLY ONCE A MONTH FOR MIGRAINE PREVENTION, Disp: , Rfl:     escitalopram (LEXAPRO) 20 MG tablet, Take 1 tablet by mouth Daily., Disp: , Rfl:     esomeprazole (nexIUM) 40 MG capsule, Take 1 capsule by mouth Daily., Disp: , Rfl:     estradiol (ESTRACE) 1 MG tablet, , Disp: , Rfl:     fluocinolone acetonide (DERMOTIC) 0.01 % oil otic oil, INSTILL 5 DROPS IN EACH EAR TWICE DAILY AS NEEDED FOR ITCHING, Disp: , Rfl:     fluticasone (FLONASE) 50 MCG/ACT nasal spray, 2 sprays by Each Nare route Daily. Shake well before using., Disp: , Rfl:     gabapentin (Neurontin) 800 MG tablet, Take 1 tablet by mouth Daily., Disp: 90 tablet, Rfl: 2    ipratropium-albuterol (DUO-NEB) 0.5-2.5 mg/3 ml nebulizer, INHALE 1 VIAL VIA NEBULIZER EVERY 4-6 HOURS AS NEEDED, Disp: , Rfl:     lisinopril (PRINIVIL,ZESTRIL) 40 MG tablet, Take 1 tablet by mouth Daily., Disp: , Rfl:     meloxicam (MOBIC) 15 MG tablet, , Disp: , Rfl:     montelukast (SINGULAIR) 10 MG tablet, , Disp: , Rfl:     Qulipta 60 MG tablet, Take 1 tablet by mouth Daily., Disp: , Rfl:     rosuvastatin (CRESTOR) 20 MG tablet, Take 1 tablet by mouth Daily., Disp: , Rfl:     tiZANidine (ZANAFLEX) 4 MG tablet, Take 1 tablet by mouth every night at bedtime., Disp: , Rfl:     vitamin D (ERGOCALCIFEROL) 1.25 MG (69503 UT) capsule capsule, , Disp: , Rfl:     traZODone (DESYREL) 150 MG tablet, TAKE 1 TABLET BY MOUTH AT BEDTIME, MAY REPEAT IN 1 HOUR AS NEEDED (Patient not taking: Reported on 8/24/2023), Disp: , Rfl:     ubrogepant 100 MG tablet, TAKE 1 TABLET BY MOUTH ONCE A DAY AT ONSET OF HEADACHE (Patient not taking: Reported on 8/24/2023), Disp: , Rfl:   No current facility-administered medications for this visit.    Allergies:   No Known Allergies    Objective     Physical Exam:  Vital Signs:   Vitals:    08/24/23 1321   BP: 128/82   Pulse: 83   Temp: 96.9 øF (36.1 øC)   SpO2: 96%   Weight: 84 kg (185 lb 3.2  "oz)   Height: 160 cm (62.99\")   PainSc:   8     Body mass index is 32.82 kg/mý.    Physical Exam    Neurologic Exam     Assessment / Plan      Assessment/Plan:   Diagnoses and all orders for this visit:    1. Myofascial pain (Primary)  -     bupivacaine (MARCAINE) 0.5 % injection 8 mL  -     methylPREDNISolone sodium succinate (SOLU-Medrol) injection 125 mg    2. BMI 32.0-32.9,adult       Follow Up:   Return in about 3 months (around 11/24/2023) for Trigger point injections.    BENSON Rico, FNP-C  [facility] Sleep Medicine  "

## 2023-11-16 ENCOUNTER — TELEPHONE (OUTPATIENT)
Dept: NEUROLOGY | Facility: CLINIC | Age: 53
End: 2023-11-16

## 2023-11-16 NOTE — TELEPHONE ENCOUNTER
Provider: CARMENZA ESCAMILLA    Caller: IRENE    Relationship to Patient: SELF    Phone Number: [phone]    Reason for Call: PATIENT STATES HER APPT AT [facility] WAS NOT DONE CORRECTLY. SHE WAS ADVISED THAT SHE WOULD HAVE A CAMERA GO DOWN HER THROAT TO SEE HER ESOPHAGUS DAMAGE. WHAT THEY ACTUALLY DID WAS HAVE HER SWALLOW WHILE BEING X-RAYED. THEY ADVISED HER THAT SHE WAS FINE AS HER FOOD WAS NOT GETTING STUCK. SHE WOULD LIKE TO SEE IF THIS SHOULD BE RESCHEDULED AS SHE IS STILL HAVING THROAT PAIN. PLEASE REVIEW AND ADVISE, THANK YOU.

## 2023-11-16 NOTE — TELEPHONE ENCOUNTER
Caller: Blanca Sandoval    Relationship to patient: Self    Best call back number: [phone]    Chief complaint: INJECTION    Type of visit: INJECTION    Requested date: ANY     If rescheduling, when is the original appointment: 11/16/23 - SICK

## 2023-12-14 ENCOUNTER — PROCEDURE VISIT (OUTPATIENT)
Age: 53
End: 2023-12-14
Payer: COMMERCIAL

## 2023-12-14 VITALS
BODY MASS INDEX: 32.25 KG/M2 | TEMPERATURE: 97.1 F | OXYGEN SATURATION: 99 % | HEIGHT: 63 IN | WEIGHT: 182 LBS | SYSTOLIC BLOOD PRESSURE: 130 MMHG | DIASTOLIC BLOOD PRESSURE: 80 MMHG | HEART RATE: 61 BPM

## 2023-12-14 DIAGNOSIS — R20.0 NUMBNESS AND TINGLING IN BOTH HANDS: ICD-10-CM

## 2023-12-14 DIAGNOSIS — R20.0 NUMBNESS OF TOES: ICD-10-CM

## 2023-12-14 DIAGNOSIS — R53.83 OTHER FATIGUE: ICD-10-CM

## 2023-12-14 DIAGNOSIS — R20.2 NUMBNESS AND TINGLING IN BOTH HANDS: ICD-10-CM

## 2023-12-14 DIAGNOSIS — G47.33 OSA (OBSTRUCTIVE SLEEP APNEA): Primary | ICD-10-CM

## 2023-12-14 PROCEDURE — 99214 OFFICE O/P EST MOD 30 MIN: CPT | Performed by: NURSE PRACTITIONER

## 2023-12-14 RX ORDER — VARENICLINE TARTRATE 1 MG/1
TABLET, FILM COATED ORAL
COMMUNITY
Start: 2023-11-20

## 2023-12-14 NOTE — PROGRESS NOTES
"     Follow Up Office Visit      Patient Name: Blanca Sandoval  : 1970   MRN: [id number]     Chief Complaint:    Chief Complaint   Patient presents with   • Follow-up     PATIENT IN OFFICE TO FOLLOW UP ON MULTIPLE COMPLAINTS       History of Present Illness: Blanca Sandoval is a 53 y.o. female who is here today to follow up and was last seen on 2023.  She had trigger point injections on 2023 and doesn't feel those helped at all.  She is seeing a pain management center in [place], [place], and states, \"They did some injections but they didn't help\".  She has been getting vitamin B12 injections (by PCP) and does not feel they have helped her at all.  She continues to have significant fatigue and have numbness in her toes.  Denies diabetes or history of chemotherapy.  She says she is no longer using her AutoPap machine and states, \"It bugs me to death\"- she is interested in the Inspire device.  She feels the AutoPap machine was \"too bothersome\".  She is interested in the Inspire device.  States, \"I need to know what is going on with me so it can be fixed and I can go back to work or get disability\".    *She is seeing a GI specialist in [place], [place].     Following taken from previous visit note:  Blanca Sandoval is a 53 y.o. female who is here today to follow up and was last seen on 2022.  She is on AutoPap therapy and says things are going well with that.  Currently on AutoPap 6-16cm, 95th percentile pressure 13.2cm, compliance 73%, AHI 1.1/hour.  She is tolerating her pressures well.  She feels her headaches and migraines may be a little better since starting AutoPap therapy.  She is using a full face mask and it is uncomfortable at times but says it was the only one she could use.  She was referred to pain management but according to EMR patient could not be contacted.  She says she would like to go to [place], for pain management, if possible. She asks about the results of her brain MRI and EEG.  She has " "quite a bit of muscle pain in her neck.  She was taking Gabapentin 600mg TID, but has been out of that for a couple of months and states, \"She sent the medicine to the wrong pharmacy but now we got that fixed\".  She felt the Gabapentin dose may need to be increased as it seems to wear off at times.   *MRI brain with and without contrast on 3/21/2023 showed no acute intracranial abnormality.   *A barium swallow study was ordered at a previous visit but she says she hasn't had that done.   *Home sleep study on 3/23/2023 showed evidence of mild LUPILLO with an AHI of 11/hour and event related hypoxia.  She was set up on PAP therapy on 5/8/2023.  Current compliance report reviewed and scanned into EMR.  *EEG on 3/21/2023 was normal; MRI brain with and without contrast was noncontributory.      Subjective      Review of Systems:   Review of Systems   Constitutional:  Positive for fatigue.   Respiratory:  Positive for apnea.    Neurological:  Positive for numbness.       I have reviewed and the following portions of the patient's history were updated as appropriate: past family history, past medical history, past social history, past surgical history and problem list.    Medications:     Current Outpatient Medications:   •  albuterol (PROVENTIL) (2.5 MG/3ML) 0.083% nebulizer solution, INHALE 1 VAIL INTO THE LUNGS EVERY 4 TO 6 HOURS AS NEEDED, Disp: , Rfl:   •  Breztri Aerosphere 160-9-4.8 MCG/ACT aerosol inhaler, INHALE 2 PUFF BY MOUTH AS DIRECTED TWICE A DAY, Disp: , Rfl:   •  cetirizine (zyrTEC) 10 MG tablet, Take 1 tablet by mouth Daily., Disp: , Rfl:   •  cyanocobalamin 1000 MCG/ML injection, INJECT 1ML INTRAMUSCULARLY EVERY MONTH, Disp: , Rfl:   •  escitalopram (LEXAPRO) 20 MG tablet, Take 1 tablet by mouth Daily., Disp: , Rfl:   •  esomeprazole (nexIUM) 40 MG capsule, Take 1 capsule by mouth Daily., Disp: , Rfl:   •  estradiol (ESTRACE) 1 MG tablet, , Disp: , Rfl:   •  fluocinolone acetonide (DERMOTIC) 0.01 % oil otic " "oil, INSTILL 5 DROPS IN EACH EAR TWICE DAILY AS NEEDED FOR ITCHING, Disp: , Rfl:   •  fluticasone (FLONASE) 50 MCG/ACT nasal spray, 2 sprays by Each Nare route Daily. Shake well before using., Disp: , Rfl:   •  gabapentin (Neurontin) 800 MG tablet, Take 1 tablet by mouth Daily., Disp: 90 tablet, Rfl: 2  •  ipratropium-albuterol (DUO-NEB) 0.5-2.5 mg/3 ml nebulizer, INHALE 1 VIAL VIA NEBULIZER EVERY 4-6 HOURS AS NEEDED, Disp: , Rfl:   •  lisinopril (PRINIVIL,ZESTRIL) 40 MG tablet, Take 1 tablet by mouth Daily., Disp: , Rfl:   •  meloxicam (MOBIC) 15 MG tablet, , Disp: , Rfl:   •  montelukast (SINGULAIR) 10 MG tablet, , Disp: , Rfl:   •  rosuvastatin (CRESTOR) 20 MG tablet, Take 1 tablet by mouth Daily., Disp: , Rfl:   •  ubrogepant 100 MG tablet, , Disp: , Rfl:   •  varenicline (CHANTIX) 1 MG tablet, , Disp: , Rfl:   •  vitamin D (ERGOCALCIFEROL) 1.25 MG (58949 UT) capsule capsule, , Disp: , Rfl:     Allergies:   No Known Allergies    Objective     Physical Exam:  Vital Signs:   Vitals:    12/14/23 1300   BP: 130/80   BP Location: Right arm   Patient Position: Sitting   Cuff Size: Adult   Pulse: 61   Temp: 97.1 °F (36.2 °C)   SpO2: 99%   Weight: 82.6 kg (182 lb)   Height: 160 cm (62.99\")   PainSc:   5   PainLoc: Back     Body mass index is 32.25 kg/m².    Physical Exam  Vitals and nursing note reviewed.   Constitutional:       General: She is not in acute distress.     Appearance: She is well-developed. She is obese. She is not diaphoretic.   HENT:      Head: Normocephalic and atraumatic.   Eyes:      Extraocular Movements: Extraocular movements intact.      Conjunctiva/sclera: Conjunctivae normal.   Pulmonary:      Effort: Pulmonary effort is normal. No respiratory distress.   Musculoskeletal:         General: Normal range of motion.   Skin:     General: Skin is warm and dry.   Neurological:      Mental Status: She is alert and oriented to person, place, and time.   Psychiatric:         Mood and Affect: Mood normal.  "        Behavior: Behavior normal.         Thought Content: Thought content normal.         Judgment: Judgment normal.         Neurologic Exam     Mental Status   Oriented to person, place, and time.        Assessment / Plan      Assessment/Plan:   Diagnoses and all orders for this visit:    1. LUPILLO (obstructive sleep apnea) (Primary)  -     Ambulatory Referral to ENT (Otolaryngology)    2. Other fatigue    3. Numbness of toes  -     EMG & Nerve Conduction Test; Future    4. Numbness and tingling in both hands  -     EMG & Nerve Conduction Test; Future    5. BMI 32.0-32.9,adult    *I have discussed the importance of adequate treatment of significant LUPILLO. I have provided patient printed education on peripheral neuropathy and the Inspire device.      Follow Up:   Return in about 3 months (around 3/14/2024).    BENSON Rico, FNP-C  [facility] Neurology and Sleep Medicine

## 2024-03-27 ENCOUNTER — TRANSCRIBE ORDERS (OUTPATIENT)
Dept: ADMINISTRATIVE | Facility: HOSPITAL | Age: 54
End: 2024-03-27
Payer: COMMERCIAL

## 2024-03-27 DIAGNOSIS — M51.36 OTHER INTERVERTEBRAL DISC DEGENERATION, LUMBAR REGION: Primary | ICD-10-CM

## 2024-04-18 ENCOUNTER — OFFICE VISIT (OUTPATIENT)
Age: 54
End: 2024-04-18
Payer: COMMERCIAL

## 2024-04-18 ENCOUNTER — LAB (OUTPATIENT)
Dept: FAMILY MEDICINE CLINIC | Facility: CLINIC | Age: 54
End: 2024-04-18
Payer: COMMERCIAL

## 2024-04-18 VITALS
OXYGEN SATURATION: 96 % | HEART RATE: 81 BPM | WEIGHT: 172 LBS | HEIGHT: 63 IN | BODY MASS INDEX: 30.48 KG/M2 | DIASTOLIC BLOOD PRESSURE: 82 MMHG | SYSTOLIC BLOOD PRESSURE: 124 MMHG | TEMPERATURE: 97.1 F

## 2024-04-18 DIAGNOSIS — R20.2 NUMBNESS AND TINGLING IN BOTH HANDS: ICD-10-CM

## 2024-04-18 DIAGNOSIS — R20.0 NUMBNESS AND TINGLING IN BOTH HANDS: ICD-10-CM

## 2024-04-18 DIAGNOSIS — G47.33 OSA (OBSTRUCTIVE SLEEP APNEA): Primary | ICD-10-CM

## 2024-04-18 DIAGNOSIS — M79.7 FIBROMYALGIA: ICD-10-CM

## 2024-04-18 LAB
BASOPHILS # BLD AUTO: 0.04 10*3/MM3 (ref 0–0.2)
BASOPHILS NFR BLD AUTO: 0.5 % (ref 0–1.5)
DEPRECATED RDW RBC AUTO: 41.8 FL (ref 37–54)
EOSINOPHIL # BLD AUTO: 0.18 10*3/MM3 (ref 0–0.4)
EOSINOPHIL NFR BLD AUTO: 2.2 % (ref 0.3–6.2)
ERYTHROCYTE [DISTWIDTH] IN BLOOD BY AUTOMATED COUNT: 12.9 % (ref 12.3–15.4)
HCT VFR BLD AUTO: 41.9 % (ref 34–46.6)
HGB BLD-MCNC: 14.1 G/DL (ref 12–15.9)
IMM GRANULOCYTES # BLD AUTO: 0.02 10*3/MM3 (ref 0–0.05)
IMM GRANULOCYTES NFR BLD AUTO: 0.2 % (ref 0–0.5)
LYMPHOCYTES # BLD AUTO: 2.48 10*3/MM3 (ref 0.7–3.1)
LYMPHOCYTES NFR BLD AUTO: 30.1 % (ref 19.6–45.3)
MCH RBC QN AUTO: 29.6 PG (ref 26.6–33)
MCHC RBC AUTO-ENTMCNC: 33.7 G/DL (ref 31.5–35.7)
MCV RBC AUTO: 88 FL (ref 79–97)
MONOCYTES # BLD AUTO: 0.55 10*3/MM3 (ref 0.1–0.9)
MONOCYTES NFR BLD AUTO: 6.7 % (ref 5–12)
NEUTROPHILS NFR BLD AUTO: 4.96 10*3/MM3 (ref 1.7–7)
NEUTROPHILS NFR BLD AUTO: 60.3 % (ref 42.7–76)
NRBC BLD AUTO-RTO: 0 /100 WBC (ref 0–0.2)
PLATELET # BLD AUTO: 241 10*3/MM3 (ref 140–450)
PMV BLD AUTO: 10.7 FL (ref 6–12)
RBC # BLD AUTO: 4.76 10*6/MM3 (ref 3.77–5.28)
WBC NRBC COR # BLD AUTO: 8.23 10*3/MM3 (ref 3.4–10.8)

## 2024-04-18 PROCEDURE — 83921 ORGANIC ACID SINGLE QUANT: CPT | Performed by: NURSE PRACTITIONER

## 2024-04-18 PROCEDURE — 99214 OFFICE O/P EST MOD 30 MIN: CPT | Performed by: NURSE PRACTITIONER

## 2024-04-18 PROCEDURE — 1159F MED LIST DOCD IN RCRD: CPT | Performed by: NURSE PRACTITIONER

## 2024-04-18 PROCEDURE — 80053 COMPREHEN METABOLIC PANEL: CPT | Performed by: NURSE PRACTITIONER

## 2024-04-18 PROCEDURE — 82607 VITAMIN B-12: CPT | Performed by: NURSE PRACTITIONER

## 2024-04-18 PROCEDURE — 1160F RVW MEDS BY RX/DR IN RCRD: CPT | Performed by: NURSE PRACTITIONER

## 2024-04-18 PROCEDURE — 85025 COMPLETE CBC W/AUTO DIFF WBC: CPT | Performed by: NURSE PRACTITIONER

## 2024-04-18 PROCEDURE — 86038 ANTINUCLEAR ANTIBODIES: CPT | Performed by: NURSE PRACTITIONER

## 2024-04-18 PROCEDURE — 82746 ASSAY OF FOLIC ACID SERUM: CPT | Performed by: NURSE PRACTITIONER

## 2024-04-18 RX ORDER — PREGABALIN 50 MG/1
50 CAPSULE ORAL 3 TIMES DAILY
Qty: 90 CAPSULE | Refills: 0 | Status: SHIPPED | OUTPATIENT
Start: 2024-04-18

## 2024-04-18 RX ORDER — PREGABALIN 75 MG/1
75 CAPSULE ORAL 3 TIMES DAILY
Qty: 90 CAPSULE | Refills: 1 | Status: SHIPPED | OUTPATIENT
Start: 2024-05-17 | End: 2025-05-17

## 2024-04-18 NOTE — PROGRESS NOTES
"     Follow Up Office Visit      Patient Name: Blanca Sandoval  : 1970   MRN: [id number]     Chief Complaint:    Chief Complaint   Patient presents with    Follow-up     Patient in office to follow up on lupillo and back pain.Patient stated she stopped using her machine, was unable to get supplies.         History of Present Illness: Blanca Sandoval is a 54 y.o. female who is here today to follow up and was last seen on 2023.    LUPILLO- she has not been wearing her AutoPap machine for a while- says she stopped receiving supplies for some reason; using a full face mask and [employer].   *Neck pain- Was referred to pain management for neck pain- had one injection and was going to have another one but insurance wouldn't approve it unless she did a course of physical therapy.  She did a course of PT and it did not help her neck pain.  She asks if all of her symptoms could be attributable to fibromyalgia.  She is taking Gabapentin 800mg BID and feels it does help some; states, \"It doesn't take all my pain away\".   *Fibromyalgia- diagnosed about 7-8 years ago; has widespread body pain; has abnormal sensations to her left arm at times; \"nerve pain like a Arnold horse around my lower back and ribs\".    Migraines- start in the base of her skull and neck and shoulders and radiates into the sides and front of the head; accompanied by nausea and vomiting; lasting more than 4 hours.   *MRI brain with and without contrast in  showed no acute intracranial abnormality.     Following taken from previous visit note:  Blanca Sandoval is a 53 y.o. female who is here today to follow up and was last seen on 2022.  She is on AutoPap therapy and says things are going well with that.  Currently on AutoPap 6-16cm, 95th percentile pressure 13.2cm, compliance 73%, AHI 1.1/hour.  She is tolerating her pressures well.  She feels her headaches and migraines may be a little better since starting AutoPap therapy.  She is using a full face " "mask and it is uncomfortable at times but says it was the only one she could use.  She was referred to pain management but according to EMR patient could not be contacted.  She says she would like to go to [place], for pain management, if possible. She asks about the results of her brain MRI and EEG.  She has quite a bit of muscle pain in her neck.  She was taking Gabapentin 600mg TID, but has been out of that for a couple of months and states, \"She sent the medicine to the wrong pharmacy but now we got that fixed\".  She felt the Gabapentin dose may need to be increased as it seems to wear off at times.   *MRI brain with and without contrast on 3/21/2023 showed no acute intracranial abnormality.   *A barium swallow study was ordered at a previous visit but she says she hasn't had that done.   *Home sleep study on 3/23/2023 showed evidence of mild LUPILLO with an AHI of 11/hour and event related hypoxia.  She was set up on PAP therapy on 5/8/2023.  Current compliance report reviewed and scanned into EMR.  *EEG on 3/21/2023 was normal; MRI brain with and without contrast was noncontributory.     Subjective      Review of Systems:   Review of Systems   Musculoskeletal:  Positive for back pain, myalgias and neck pain.   Neurological:  Positive for numbness and headache.       I have reviewed and the following portions of the patient's history were updated as appropriate: past family history, past medical history, past social history, past surgical history and problem list.    Medications:     Current Outpatient Medications:     albuterol (PROVENTIL) (2.5 MG/3ML) 0.083% nebulizer solution, INHALE 1 VAIL INTO THE LUNGS EVERY 4 TO 6 HOURS AS NEEDED, Disp: , Rfl:     Breztri Aerosphere 160-9-4.8 MCG/ACT aerosol inhaler, INHALE 2 PUFF BY MOUTH AS DIRECTED TWICE A DAY, Disp: , Rfl:     cetirizine (zyrTEC) 10 MG tablet, Take 1 tablet by mouth Daily., Disp: , Rfl:     cyanocobalamin 1000 MCG/ML injection, INJECT 1ML " "INTRAMUSCULARLY EVERY MONTH, Disp: , Rfl:     escitalopram (LEXAPRO) 20 MG tablet, Take 1 tablet by mouth Daily., Disp: , Rfl:     esomeprazole (nexIUM) 40 MG capsule, Take 1 capsule by mouth Daily., Disp: , Rfl:     estradiol (ESTRACE) 1 MG tablet, , Disp: , Rfl:     fluocinolone acetonide (DERMOTIC) 0.01 % oil otic oil, INSTILL 5 DROPS IN EACH EAR TWICE DAILY AS NEEDED FOR ITCHING, Disp: , Rfl:     fluticasone (FLONASE) 50 MCG/ACT nasal spray, 2 sprays by Each Nare route Daily. Shake well before using., Disp: , Rfl:     ipratropium-albuterol (DUO-NEB) 0.5-2.5 mg/3 ml nebulizer, INHALE 1 VIAL VIA NEBULIZER EVERY 4-6 HOURS AS NEEDED, Disp: , Rfl:     lisinopril (PRINIVIL,ZESTRIL) 40 MG tablet, Take 1 tablet by mouth Daily., Disp: , Rfl:     meloxicam (MOBIC) 15 MG tablet, , Disp: , Rfl:     montelukast (SINGULAIR) 10 MG tablet, , Disp: , Rfl:     rosuvastatin (CRESTOR) 20 MG tablet, Take 1 tablet by mouth Daily., Disp: , Rfl:     ubrogepant 100 MG tablet, , Disp: , Rfl:     varenicline (CHANTIX) 1 MG tablet, , Disp: , Rfl:     vitamin D (ERGOCALCIFEROL) 1.25 MG (36172 UT) capsule capsule, , Disp: , Rfl:     pregabalin (Lyrica) 50 MG capsule, Take 1 capsule by mouth 3 (Three) Times a Day., Disp: 90 capsule, Rfl: 0    [START ON 5/17/2024] pregabalin (Lyrica) 75 MG capsule, Take 1 capsule by mouth 3 (Three) Times a Day., Disp: 90 capsule, Rfl: 1    Allergies:   No Known Allergies    Objective     Physical Exam:  Vital Signs:   Vitals:    04/18/24 1423   BP: 124/82   BP Location: Right arm   Patient Position: Sitting   Cuff Size: Adult   Pulse: 81   Temp: 97.1 °F (36.2 °C)   SpO2: 96%   Weight: 78 kg (172 lb)   Height: 160 cm (63\")   PainSc:   7   PainLoc: Neck  Comment: head     Body mass index is 30.47 kg/m².    Physical Exam  Vitals and nursing note reviewed.   Constitutional:       General: She is not in acute distress.     Appearance: Normal appearance. She is well-developed. She is obese. She is not diaphoretic. "   HENT:      Head: Normocephalic and atraumatic.   Eyes:      Extraocular Movements: Extraocular movements intact.      Conjunctiva/sclera: Conjunctivae normal.   Pulmonary:      Effort: Pulmonary effort is normal. No respiratory distress.   Musculoskeletal:         General: Normal range of motion.      Comments: Ambulatory without assistance   Skin:     General: Skin is warm and dry.   Neurological:      Mental Status: She is alert and oriented to person, place, and time.   Psychiatric:         Mood and Affect: Mood normal.         Behavior: Behavior normal.         Thought Content: Thought content normal.         Judgment: Judgment normal.         Neurologic Exam     Mental Status   Oriented to person, place, and time.        Assessment / Plan      Assessment/Plan:   Diagnoses and all orders for this visit:    1. LUPILLO (obstructive sleep apnea) (Primary)    2. Fibromyalgia  -     MUNIRA by IFA, Reflex 9-biomarkers profile; Future  -     Methylmalonic Acid, Serum; Future  -     Folate; Future  -     Vitamin B12; Future  -     Comprehensive Metabolic Panel; Future  -     CBC & Differential; Future  -     pregabalin (Lyrica) 50 MG capsule; Take 1 capsule by mouth 3 (Three) Times a Day.  Dispense: 90 capsule; Refill: 0  -     pregabalin (Lyrica) 75 MG capsule; Take 1 capsule by mouth 3 (Three) Times a Day.  Dispense: 90 capsule; Refill: 1    3. Numbness and tingling in both hands  -     MUNIRA by IFA, Reflex 9-biomarkers profile; Future  -     Methylmalonic Acid, Serum; Future  -     Folate; Future  -     Vitamin B12; Future  -     Comprehensive Metabolic Panel; Future  -     CBC & Differential; Future    4. BMI 30.0-30.9,adult    *Patient education on Fibromyalgia and MUNIRA lab test provided today.   *Indications and possible SEs of Pregabalin discussed with patient. Advised her to stop the Gabapentin and start the Pregabalin to see if it helps more with her pain.   *Encouraged patient to use her PAP machine for at least 5  hours every night- increased compliance is the goal.   *Order for PAP supplies sent to [facility].       Follow Up:   Return in about 8 weeks (around 6/13/2024) for Follow Up.    BENSON Rico, FNP-C  [facility]

## 2024-04-20 LAB
ALBUMIN SERPL-MCNC: 4.6 G/DL (ref 3.8–4.9)
ALBUMIN/GLOB SERPL: 1.7 {RATIO} (ref 1.2–2.2)
ALP SERPL-CCNC: 85 IU/L (ref 44–121)
ALT SERPL-CCNC: 15 IU/L (ref 0–32)
AST SERPL-CCNC: 16 IU/L (ref 0–40)
BILIRUB SERPL-MCNC: 0.2 MG/DL (ref 0–1.2)
BUN SERPL-MCNC: 10 MG/DL (ref 6–24)
BUN/CREAT SERPL: 11 (ref 9–23)
CALCIUM SERPL-MCNC: 9.9 MG/DL (ref 8.7–10.2)
CHLORIDE SERPL-SCNC: 101 MMOL/L (ref 96–106)
CO2 SERPL-SCNC: 23 MMOL/L (ref 20–29)
CREAT SERPL-MCNC: 0.94 MG/DL (ref 0.57–1)
EGFRCR SERPLBLD CKD-EPI 2021: 72 ML/MIN/1.73
FOLATE SERPL-MCNC: 5.4 NG/ML
GLOBULIN SER CALC-MCNC: 2.7 G/DL (ref 1.5–4.5)
GLUCOSE SERPL-MCNC: 91 MG/DL (ref 70–99)
POTASSIUM SERPL-SCNC: 4.6 MMOL/L (ref 3.5–5.2)
PROT SERPL-MCNC: 7.3 G/DL (ref 6–8.5)
SODIUM SERPL-SCNC: 142 MMOL/L (ref 134–144)
VIT B12 SERPL-MCNC: 508 PG/ML (ref 232–1245)

## 2024-04-22 DIAGNOSIS — M79.7 FIBROMYALGIA: ICD-10-CM

## 2024-04-22 LAB
ANA SER QL IF: NEGATIVE
LABORATORY COMMENT REPORT: NORMAL

## 2024-04-23 RX ORDER — PREGABALIN 50 MG/1
50 CAPSULE ORAL 3 TIMES DAILY
Qty: 90 CAPSULE | Refills: 0 | OUTPATIENT
Start: 2024-04-23

## 2024-04-26 LAB — METHYLMALONATE SERPL-SCNC: 199 NMOL/L (ref 0–378)

## 2024-05-24 ENCOUNTER — TELEPHONE (OUTPATIENT)
Dept: NEUROLOGY | Facility: CLINIC | Age: 54
End: 2024-05-24
Payer: COMMERCIAL

## 2024-05-24 NOTE — TELEPHONE ENCOUNTER
Caller: [facility], [place] - [address] - [phone] [facility] [phone] FX    Relationship: Pharmacy    What was the call regarding: ROSEMARY CALLED TO GET VERIFICATION THAT THE PREGABLIN IS REPLACING THE GABAPENTIN.     PLEASE REVIEW  THANK YOU

## 2024-05-28 NOTE — TELEPHONE ENCOUNTER
Yes, she is trying the Pregabalin in place of the Gabapentin to see if she gets more efficacy with the Pregabalin.

## 2024-06-13 ENCOUNTER — OFFICE VISIT (OUTPATIENT)
Age: 54
End: 2024-06-13
Payer: COMMERCIAL

## 2024-06-13 VITALS
TEMPERATURE: 97.3 F | OXYGEN SATURATION: 98 % | DIASTOLIC BLOOD PRESSURE: 90 MMHG | BODY MASS INDEX: 40.5 KG/M2 | HEIGHT: 55 IN | HEART RATE: 86 BPM | WEIGHT: 175 LBS | SYSTOLIC BLOOD PRESSURE: 140 MMHG

## 2024-06-13 DIAGNOSIS — G43.009 MIGRAINE WITHOUT AURA AND WITHOUT STATUS MIGRAINOSUS, NOT INTRACTABLE: ICD-10-CM

## 2024-06-13 DIAGNOSIS — R20.0 NUMBNESS AND TINGLING IN BOTH HANDS: ICD-10-CM

## 2024-06-13 DIAGNOSIS — M79.7 FIBROMYALGIA: Primary | ICD-10-CM

## 2024-06-13 DIAGNOSIS — M54.40 ACUTE RIGHT-SIDED LOW BACK PAIN WITH SCIATICA, SCIATICA LATERALITY UNSPECIFIED: ICD-10-CM

## 2024-06-13 DIAGNOSIS — R20.2 NUMBNESS AND TINGLING IN BOTH HANDS: ICD-10-CM

## 2024-06-13 DIAGNOSIS — G47.33 OSA (OBSTRUCTIVE SLEEP APNEA): ICD-10-CM

## 2024-06-13 PROCEDURE — 1160F RVW MEDS BY RX/DR IN RCRD: CPT | Performed by: NURSE PRACTITIONER

## 2024-06-13 PROCEDURE — 1159F MED LIST DOCD IN RCRD: CPT | Performed by: NURSE PRACTITIONER

## 2024-06-13 PROCEDURE — 99213 OFFICE O/P EST LOW 20 MIN: CPT | Performed by: NURSE PRACTITIONER

## 2024-06-13 RX ORDER — PREDNISONE 20 MG/1
20 TABLET ORAL 2 TIMES DAILY
Qty: 10 TABLET | Refills: 0 | Status: SHIPPED | OUTPATIENT
Start: 2024-06-13 | End: 2024-06-18

## 2024-06-13 NOTE — PROGRESS NOTES
"     Follow Up Office Visit      Patient Name: Blanca Sandoval  : 1970   MRN: [id number]     Chief Complaint:    Chief Complaint   Patient presents with    Follow-up     Patient in office to follow up on lupillo.         History of Present Illness: Blanca Sandoval is a 54 y.o. female who is here today to follow up with LUPILLO, fibromyalgia and migraines.  She is not using her AutoPap machine and says it is making her mouth dry- states, \"It's aggravating and it dries my mouth out\".  She tried Pregabalin for fibromyalgia but feels it didn't help so she stopped taking that.  Her PCP is going to restart the Gabapentin.  NCV/EMG was ordered but she says she is unable to travel to [place] to have that done.  Asks if she can see Dr. Shaw's office.  Migraines \"Seem to be better\".  She is having low back pain and states, \"That is now killing me\".  No saddle anesthesia.  No bladder/bowel incontinence.   *Labs on 2024 were normal (CBC, CMP, MUNIRA, vitamin B12, folate, MMA)    Following taken from previous visit note:  Blanca Sandoval is a 54 y.o. female who is here today to follow up and was last seen on 2023.    LUPILLO- she has not been wearing her AutoPap machine for a while- says she stopped receiving supplies for some reason; using a full face mask and [employer].   *Neck pain- Was referred to pain management for neck pain- had one injection and was going to have another one but insurance wouldn't approve it unless she did a course of physical therapy.  She did a course of PT and it did not help her neck pain.  She asks if all of her symptoms could be attributable to fibromyalgia.  She is taking Gabapentin 800mg BID and feels it does help some; states, \"It doesn't take all my pain away\".   *Fibromyalgia- diagnosed about 7-8 years ago; has widespread body pain; has abnormal sensations to her left arm at times; \"nerve pain like a Arnold horse around my lower back and ribs\".    Migraines- start in the base of her skull and " neck and shoulders and radiates into the sides and front of the head; accompanied by nausea and vomiting; lasting more than 4 hours.   *MRI brain with and without contrast in 2022 showed no acute intracranial abnormality.     Subjective      Review of Systems:   Review of Systems   Musculoskeletal:  Positive for back pain.       I have reviewed and the following portions of the patient's history were updated as appropriate: past family history, past medical history, past social history, past surgical history and problem list.    Medications:     Current Outpatient Medications:     albuterol (PROVENTIL) (2.5 MG/3ML) 0.083% nebulizer solution, INHALE 1 VAIL INTO THE LUNGS EVERY 4 TO 6 HOURS AS NEEDED, Disp: , Rfl:     Breztri Aerosphere 160-9-4.8 MCG/ACT aerosol inhaler, INHALE 2 PUFF BY MOUTH AS DIRECTED TWICE A DAY, Disp: , Rfl:     cetirizine (zyrTEC) 10 MG tablet, Take 1 tablet by mouth Daily., Disp: , Rfl:     cyanocobalamin 1000 MCG/ML injection, INJECT 1ML INTRAMUSCULARLY EVERY MONTH, Disp: , Rfl:     escitalopram (LEXAPRO) 20 MG tablet, Take 1 tablet by mouth Daily., Disp: , Rfl:     esomeprazole (nexIUM) 40 MG capsule, Take 1 capsule by mouth Daily., Disp: , Rfl:     estradiol (ESTRACE) 1 MG tablet, , Disp: , Rfl:     fluocinolone acetonide (DERMOTIC) 0.01 % oil otic oil, INSTILL 5 DROPS IN EACH EAR TWICE DAILY AS NEEDED FOR ITCHING, Disp: , Rfl:     fluticasone (FLONASE) 50 MCG/ACT nasal spray, 2 sprays by Each Nare route Daily. Shake well before using., Disp: , Rfl:     ipratropium-albuterol (DUO-NEB) 0.5-2.5 mg/3 ml nebulizer, INHALE 1 VIAL VIA NEBULIZER EVERY 4-6 HOURS AS NEEDED, Disp: , Rfl:     lisinopril (PRINIVIL,ZESTRIL) 40 MG tablet, Take 1 tablet by mouth Daily., Disp: , Rfl:     meloxicam (MOBIC) 15 MG tablet, , Disp: , Rfl:     montelukast (SINGULAIR) 10 MG tablet, , Disp: , Rfl:     rosuvastatin (CRESTOR) 20 MG tablet, Take 1 tablet by mouth Daily., Disp: , Rfl:     ubrogepant 100 MG tablet, ,  "Disp: , Rfl:     varenicline (CHANTIX) 1 MG tablet, , Disp: , Rfl:     vitamin D (ERGOCALCIFEROL) 1.25 MG (09484 UT) capsule capsule, , Disp: , Rfl:     predniSONE (DELTASONE) 20 MG tablet, Take 1 tablet by mouth 2 (Two) Times a Day for 5 days., Disp: 10 tablet, Rfl: 0    Allergies:   No Known Allergies    Objective     Physical Exam:  Vital Signs:   Vitals:    06/13/24 1405   BP: 140/90   BP Location: Right arm   Patient Position: Sitting   Cuff Size: Adult   Pulse: 86   Temp: 97.3 °F (36.3 °C)   SpO2: 98%   Weight: 79.4 kg (175 lb)   Height: 63 cm (24.8\")   PainSc:   6   PainLoc: Back     Body mass index is 200 kg/m².    Physical Exam  Vitals and nursing note reviewed.   Constitutional:       General: She is not in acute distress.     Appearance: Normal appearance. She is well-developed. She is not diaphoretic.   HENT:      Head: Normocephalic and atraumatic.   Eyes:      Extraocular Movements: Extraocular movements intact.      Conjunctiva/sclera: Conjunctivae normal.   Pulmonary:      Effort: Pulmonary effort is normal. No respiratory distress.   Musculoskeletal:         General: Normal range of motion.   Skin:     General: Skin is warm and dry.   Neurological:      Mental Status: She is alert and oriented to person, place, and time.   Psychiatric:         Mood and Affect: Mood normal.         Behavior: Behavior normal.         Thought Content: Thought content normal.         Judgment: Judgment normal.         Neurologic Exam     Mental Status   Oriented to person, place, and time.        Assessment / Plan      Assessment/Plan:   Diagnoses and all orders for this visit:    1. Fibromyalgia (Primary)    2. LUPILLO (obstructive sleep apnea)    3. Migraine without aura and without status migrainosus, not intractable    4. Numbness and tingling in both hands  -     Ambulatory Referral to Orthopedic Surgery    5. Acute right-sided low back pain with sciatica, sciatica laterality unspecified  -     predniSONE (DELTASONE) " 20 MG tablet; Take 1 tablet by mouth 2 (Two) Times a Day for 5 days.  Dispense: 10 tablet; Refill: 0    *Patient referral to [facility].    *Advised patient to avoid sleeping on her back and try to sleep with the head of her bed elevated.  No driving if drowsy.   *Short course of Prednisone to see if it helps with acute low back pain. I have encouraged her to discuss this pain with her PCP, at her appointment, next week.     Follow Up:   No follow-ups on file.    BENSON Rico, FNP-C  [facility]

## 2024-06-18 DIAGNOSIS — M79.7 FIBROMYALGIA: ICD-10-CM

## 2024-06-18 DIAGNOSIS — M54.40 ACUTE RIGHT-SIDED LOW BACK PAIN WITH SCIATICA, SCIATICA LATERALITY UNSPECIFIED: ICD-10-CM

## 2024-06-19 RX ORDER — PREGABALIN 75 MG/1
CAPSULE ORAL
Qty: 90 CAPSULE | Refills: 1 | OUTPATIENT
Start: 2024-06-19

## 2024-06-19 RX ORDER — PREDNISONE 20 MG/1
TABLET ORAL
Qty: 10 TABLET | Refills: 10 | OUTPATIENT
Start: 2024-06-19

## 2024-06-24 DIAGNOSIS — M79.7 FIBROMYALGIA: ICD-10-CM

## 2024-06-25 RX ORDER — PREGABALIN 75 MG/1
CAPSULE ORAL
Qty: 90 CAPSULE | Refills: 1 | OUTPATIENT
Start: 2024-06-25

## 2025-05-01 ENCOUNTER — OFFICE VISIT (OUTPATIENT)
Age: 55
End: 2025-05-01
Payer: COMMERCIAL

## 2025-05-01 VITALS
TEMPERATURE: 96.6 F | HEIGHT: 63 IN | BODY MASS INDEX: 32.43 KG/M2 | OXYGEN SATURATION: 96 % | WEIGHT: 183 LBS | HEART RATE: 81 BPM

## 2025-05-01 DIAGNOSIS — R20.0 NUMBNESS AND TINGLING OF BOTH FEET: ICD-10-CM

## 2025-05-01 DIAGNOSIS — M79.7 FIBROMYALGIA: ICD-10-CM

## 2025-05-01 DIAGNOSIS — R20.2 NUMBNESS AND TINGLING IN BOTH HANDS: Primary | ICD-10-CM

## 2025-05-01 DIAGNOSIS — R20.0 NUMBNESS AND TINGLING IN BOTH HANDS: Primary | ICD-10-CM

## 2025-05-01 DIAGNOSIS — R20.2 NUMBNESS AND TINGLING OF BOTH FEET: ICD-10-CM

## 2025-05-01 DIAGNOSIS — Z79.899 CONTROLLED SUBSTANCE AGREEMENT SIGNED: ICD-10-CM

## 2025-05-01 PROCEDURE — 99214 OFFICE O/P EST MOD 30 MIN: CPT | Performed by: NURSE PRACTITIONER

## 2025-05-01 PROCEDURE — 1159F MED LIST DOCD IN RCRD: CPT | Performed by: NURSE PRACTITIONER

## 2025-05-01 PROCEDURE — 1160F RVW MEDS BY RX/DR IN RCRD: CPT | Performed by: NURSE PRACTITIONER

## 2025-05-01 RX ORDER — GABAPENTIN 600 MG/1
600 TABLET ORAL 3 TIMES DAILY
Qty: 90 TABLET | Refills: 5 | Status: SHIPPED | OUTPATIENT
Start: 2025-05-01

## 2025-05-01 NOTE — PROGRESS NOTES
"     Follow Up Office Visit      Patient Name: Blanca Sandoval  : 1970   MRN: [id number]     Chief Complaint:    Chief Complaint   Patient presents with    Follow-up     Patient in office to follow up on fibromyalgia        History of Present Illness: Blanca Sandoval is a 55 y.o. female who is here today to follow up with fibromyalgia and was last seen on 2024.  She has returned today because her PCP stopped prescribing Gabapentin because she picked up a prescription for Gabapentin and Pregabalin at the same time.  She tried Pregabalin and it only helped her sleep and didn't help her widespread body pain \"From the top to the bottom, I have it in my neck, ribs, head, back and legs\".  She also has lots of muscle spasms in her body.  Her widespread body pain was improved with Gabapentin 600mg TID and she tolerated that well.  She asks for another referral to [facility] for numbness and tingling in her feet and hands. She wants to have a nerve conduction study done there, if possible.     Following taken from previous visit note:  Blanca Sandoval is a 54 y.o. female who is here today to follow up with LUPILLO, fibromyalgia and migraines.  She is not using her AutoPap machine and says it is making her mouth dry- states, \"It's aggravating and it dries my mouth out\".  She tried Pregabalin for fibromyalgia but feels it didn't help so she stopped taking that.  Her PCP is going to restart the Gabapentin.  NCV/EMG was ordered but she says she is unable to travel to [place] to have that done.  Asks if she can see Dr. Shaw's office.  Migraines \"Seem to be better\".  She is having low back pain and states, \"That is now killing me\".  No saddle anesthesia.  No bladder/bowel incontinence.   *Labs on 2024 were normal (CBC, CMP, MUNIRA, vitamin B12, folate, MMA)    Subjective      Review of Systems:   Review of Systems   Musculoskeletal:  Positive for back pain and neck pain.       I have reviewed and the following " portions of the patient's history were updated as appropriate: past family history, past medical history, past social history, past surgical history and problem list.    Medications:     Current Outpatient Medications:     albuterol (PROVENTIL) (2.5 MG/3ML) 0.083% nebulizer solution, INHALE 1 VAIL INTO THE LUNGS EVERY 4 TO 6 HOURS AS NEEDED, Disp: , Rfl:     Breztri Aerosphere 160-9-4.8 MCG/ACT aerosol inhaler, INHALE 2 PUFF BY MOUTH AS DIRECTED TWICE A DAY, Disp: , Rfl:     cetirizine (zyrTEC) 10 MG tablet, Take 1 tablet by mouth Daily., Disp: , Rfl:     cyanocobalamin 1000 MCG/ML injection, INJECT 1ML INTRAMUSCULARLY EVERY MONTH, Disp: , Rfl:     escitalopram (LEXAPRO) 20 MG tablet, Take 1 tablet by mouth Daily., Disp: , Rfl:     esomeprazole (nexIUM) 40 MG capsule, Take 1 capsule by mouth Daily., Disp: , Rfl:     estradiol (ESTRACE) 1 MG tablet, , Disp: , Rfl:     fluocinolone acetonide (DERMOTIC) 0.01 % oil otic oil, INSTILL 5 DROPS IN EACH EAR TWICE DAILY AS NEEDED FOR ITCHING, Disp: , Rfl:     fluticasone (FLONASE) 50 MCG/ACT nasal spray, 2 sprays by Each Nare route Daily. Shake well before using., Disp: , Rfl:     ipratropium-albuterol (DUO-NEB) 0.5-2.5 mg/3 ml nebulizer, INHALE 1 VIAL VIA NEBULIZER EVERY 4-6 HOURS AS NEEDED, Disp: , Rfl:     lisinopril (PRINIVIL,ZESTRIL) 40 MG tablet, Take 1 tablet by mouth Daily., Disp: , Rfl:     meloxicam (MOBIC) 15 MG tablet, , Disp: , Rfl:     montelukast (SINGULAIR) 10 MG tablet, , Disp: , Rfl:     rosuvastatin (CRESTOR) 20 MG tablet, Take 1 tablet by mouth Daily., Disp: , Rfl:     ubrogepant 100 MG tablet, , Disp: , Rfl:     varenicline (CHANTIX) 1 MG tablet, , Disp: , Rfl:     vitamin D (ERGOCALCIFEROL) 1.25 MG (64224 UT) capsule capsule, , Disp: , Rfl:     gabapentin (Neurontin) 600 MG tablet, Take 1 tablet by mouth 3 (Three) Times a Day., Disp: 90 tablet, Rfl: 5    Allergies:   No Known Allergies    Objective     Physical Exam:  Vital Signs:   Vitals:    05/01/25  "1421   Pulse: 81   Temp: 96.6 °F (35.9 °C)   SpO2: 96%   Weight: 83 kg (183 lb)   Height: 160 cm (63\")   PainSc: 5    PainLoc: Back     Body mass index is 32.42 kg/m².    Physical Exam  Vitals and nursing note reviewed.   Constitutional:       General: She is not in acute distress.     Appearance: Normal appearance. She is well-developed. She is obese. She is not diaphoretic.   HENT:      Head: Normocephalic and atraumatic.   Eyes:      Extraocular Movements: Extraocular movements intact.      Conjunctiva/sclera: Conjunctivae normal.   Pulmonary:      Effort: Pulmonary effort is normal. No respiratory distress.   Musculoskeletal:         General: Normal range of motion.   Skin:     General: Skin is warm and dry.   Neurological:      Mental Status: She is alert and oriented to person, place, and time.   Psychiatric:         Mood and Affect: Mood normal.         Behavior: Behavior normal.         Thought Content: Thought content normal.         Judgment: Judgment normal.         Neurological Exam  Mental Status  Alert. Oriented to person, place, and time.    Cranial Nerves  CN III, IV, VI: Extraocular movements intact bilaterally.        Assessment / Plan      Assessment/Plan:   Diagnoses and all orders for this visit:    1. Numbness and tingling in both hands (Primary)  -     Ambulatory Referral to Orthopedic Surgery  -     gabapentin (Neurontin) 600 MG tablet; Take 1 tablet by mouth 3 (Three) Times a Day.  Dispense: 90 tablet; Refill: 5    2. Numbness and tingling of both feet  -     Ambulatory Referral to Orthopedic Surgery  -     gabapentin (Neurontin) 600 MG tablet; Take 1 tablet by mouth 3 (Three) Times a Day.  Dispense: 90 tablet; Refill: 5    3. Fibromyalgia  -     gabapentin (Neurontin) 600 MG tablet; Take 1 tablet by mouth 3 (Three) Times a Day.  Dispense: 90 tablet; Refill: 5    4. Controlled substance agreement signed  -     Urine Drug Screen - Urine, Clean Catch; Future    *Referral to [facility] per patient request.   *CSA in place and [facility] requested for review. Will start back on Gabapentin 600mg TID.     Follow Up:   Return in about 3 months (around 8/1/2025) for Follow Up.    BENSON Rico, FNP-C  [facility]

## 2025-08-07 ENCOUNTER — TELEPHONE (OUTPATIENT)
Dept: NEUROLOGY | Facility: CLINIC | Age: 55
End: 2025-08-07
Payer: COMMERCIAL